# Patient Record
Sex: FEMALE | Race: WHITE | NOT HISPANIC OR LATINO | Employment: FULL TIME | URBAN - METROPOLITAN AREA
[De-identification: names, ages, dates, MRNs, and addresses within clinical notes are randomized per-mention and may not be internally consistent; named-entity substitution may affect disease eponyms.]

---

## 2020-12-28 ENCOUNTER — TELEPHONE (OUTPATIENT)
Dept: INTERNAL MEDICINE | Facility: CLINIC | Age: 20
End: 2020-12-28

## 2020-12-28 NOTE — TELEPHONE ENCOUNTER
Is she referring to the rash we discussed back in May? We did the topical gel that she has at home but we also used a steroid to get this to go away last time, so not surprised this ointment alone isn't making it better. However, oral steroids is soemthign I would want to see the rash for to make sure it is still a good idea bc its been a long time she she had that rash and with potential side effects of this type of medication want to make sure it is definitely necessary. If she can't wait to get in to see me, she may need to go to an urgent care to be seen sooner unfortunately. Looks like next time I can work her in reasonably is 1/7 between our [address], so that may be too long to wait to have it looked at

## 2020-12-28 NOTE — TELEPHONE ENCOUNTER
Pt called stating that the rash on her mouth has come back and has been using the gel that you had prescribed but it seems to now be making it worse. Asked if there was something else that she could take to help

## 2021-01-14 ENCOUNTER — OFFICE VISIT (OUTPATIENT)
Dept: INTERNAL MEDICINE | Facility: CLINIC | Age: 21
End: 2021-01-14

## 2021-01-14 VITALS
RESPIRATION RATE: 16 BRPM | SYSTOLIC BLOOD PRESSURE: 104 MMHG | DIASTOLIC BLOOD PRESSURE: 66 MMHG | WEIGHT: 155 LBS | OXYGEN SATURATION: 98 % | BODY MASS INDEX: 25.83 KG/M2 | HEART RATE: 114 BPM | TEMPERATURE: 97.7 F | HEIGHT: 65 IN

## 2021-01-14 DIAGNOSIS — L71.0 PERIORAL DERMATITIS: Primary | Chronic | ICD-10-CM

## 2021-01-14 DIAGNOSIS — F41.1 GENERALIZED ANXIETY DISORDER: ICD-10-CM

## 2021-01-14 DIAGNOSIS — G43.109 MIGRAINE WITH AURA AND WITHOUT STATUS MIGRAINOSUS, NOT INTRACTABLE: ICD-10-CM

## 2021-01-14 PROBLEM — L70.9 ACNE: Status: ACTIVE | Noted: 2021-01-14

## 2021-01-14 PROBLEM — M41.9 SCOLIOSIS: Status: ACTIVE | Noted: 2021-01-14

## 2021-01-14 PROCEDURE — 99214 OFFICE O/P EST MOD 30 MIN: CPT | Performed by: INTERNAL MEDICINE

## 2021-01-14 RX ORDER — AMITRIPTYLINE HYDROCHLORIDE 50 MG/1
50 TABLET, FILM COATED ORAL NIGHTLY
COMMUNITY
End: 2021-08-19

## 2021-01-14 RX ORDER — PROPRANOLOL HYDROCHLORIDE 40 MG/1
40 TABLET ORAL 3 TIMES DAILY PRN
Qty: 30 TABLET | Refills: 2 | Status: SHIPPED | OUTPATIENT
Start: 2021-01-14 | End: 2022-01-27

## 2021-01-14 RX ORDER — ESCITALOPRAM OXALATE 5 MG/1
5 TABLET ORAL DAILY
COMMUNITY
End: 2021-07-15

## 2021-01-14 RX ORDER — NORETHINDRONE ACETATE AND ETHINYL ESTRADIOL AND FERROUS FUMARATE 1MG-20(24)
KIT ORAL
COMMUNITY
End: 2021-07-15

## 2021-01-14 RX ORDER — RIZATRIPTAN BENZOATE 10 MG/1
10 TABLET, ORALLY DISINTEGRATING ORAL ONCE AS NEEDED
COMMUNITY
End: 2022-01-27 | Stop reason: SDUPTHER

## 2021-01-14 RX ORDER — METRONIDAZOLE 10 MG/G
GEL TOPICAL DAILY
Qty: 60 G | Refills: 2 | Status: SHIPPED | OUTPATIENT
Start: 2021-01-14

## 2021-01-14 NOTE — ASSESSMENT & PLAN NOTE
CONTROLLED  - cont on amitriptyline nightly for preventative medication, no refills needed currently  - cont prn use of maxalt, has not needed in several months

## 2021-01-14 NOTE — ASSESSMENT & PLAN NOTE
"UNCONTROLLED  - cont on lexapro at 5 mg daily for now, no refills needed at this time  - Reviewed potential side effects of medication including sexual performance changes, insomnia, fatigue, weight changes. Pt tolerating well without any issues.   - pt wants to try a prn medication for her \"bad days\" before increasing her lexapro, will send propranolol for prn use, reviewed potential side effects, benefits of meds and when to take them  - pt to schedule an appt with a counselor  "

## 2021-07-15 ENCOUNTER — OFFICE VISIT (OUTPATIENT)
Dept: INTERNAL MEDICINE | Facility: CLINIC | Age: 21
End: 2021-07-15

## 2021-07-15 VITALS
DIASTOLIC BLOOD PRESSURE: 76 MMHG | HEIGHT: 65 IN | WEIGHT: 147 LBS | SYSTOLIC BLOOD PRESSURE: 114 MMHG | HEART RATE: 68 BPM | RESPIRATION RATE: 16 BRPM | OXYGEN SATURATION: 98 % | BODY MASS INDEX: 24.49 KG/M2 | TEMPERATURE: 96.6 F

## 2021-07-15 DIAGNOSIS — Z00.00 ROUTINE GENERAL MEDICAL EXAMINATION AT A HEALTH CARE FACILITY: Primary | ICD-10-CM

## 2021-07-15 DIAGNOSIS — Z11.59 ENCOUNTER FOR HEPATITIS C SCREENING TEST FOR LOW RISK PATIENT: ICD-10-CM

## 2021-07-15 DIAGNOSIS — Z01.83 ENCOUNTER FOR BLOOD TYPING: ICD-10-CM

## 2021-07-15 DIAGNOSIS — G43.109 MIGRAINE WITH AURA AND WITHOUT STATUS MIGRAINOSUS, NOT INTRACTABLE: ICD-10-CM

## 2021-07-15 DIAGNOSIS — F41.1 GENERALIZED ANXIETY DISORDER: ICD-10-CM

## 2021-07-15 LAB
BILIRUB BLD-MCNC: NEGATIVE MG/DL
CLARITY, POC: CLEAR
COLOR UR: YELLOW
GLUCOSE UR STRIP-MCNC: NEGATIVE MG/DL
KETONES UR QL: NEGATIVE
LEUKOCYTE EST, POC: NEGATIVE
NITRITE UR-MCNC: NEGATIVE MG/ML
PH UR: 6 [PH] (ref 5–8)
PROT UR STRIP-MCNC: NEGATIVE MG/DL
RBC # UR STRIP: NEGATIVE /UL
SP GR UR: 1.03 (ref 1–1.03)
UROBILINOGEN UR QL: NORMAL

## 2021-07-15 PROCEDURE — 81003 URINALYSIS AUTO W/O SCOPE: CPT | Performed by: INTERNAL MEDICINE

## 2021-07-15 PROCEDURE — 90715 TDAP VACCINE 7 YRS/> IM: CPT | Performed by: INTERNAL MEDICINE

## 2021-07-15 PROCEDURE — 90471 IMMUNIZATION ADMIN: CPT | Performed by: INTERNAL MEDICINE

## 2021-07-15 PROCEDURE — 99395 PREV VISIT EST AGE 18-39: CPT | Performed by: INTERNAL MEDICINE

## 2021-07-15 RX ORDER — LEVONORGESTREL AND ETHINYL ESTRADIOL 0.15-0.03
1 KIT ORAL DAILY
COMMUNITY
Start: 2021-06-15 | End: 2022-01-27

## 2021-07-15 RX ORDER — CLINDAMYCIN PHOSPHATE 10 UG/ML
LOTION TOPICAL
COMMUNITY
Start: 2021-07-14 | End: 2022-01-27 | Stop reason: SDUPTHER

## 2021-07-15 RX ORDER — KETOCONAZOLE 20 MG/ML
SHAMPOO TOPICAL
COMMUNITY
Start: 2021-05-03 | End: 2022-01-27 | Stop reason: SDUPTHER

## 2021-07-15 NOTE — PROGRESS NOTES
"Chief Complaint   Patient presents with   • Annual Exam       Subjective   History of Present Illness    Merlene Baker is a 20 y.o. female here for annual wellness. Pt does not have acute issues to discuss today. States overall things are going well. Taking all meds as prescribed without any issues.     The following portions of the patient's history were reviewed and updated as appropriate: allergies, current medications, past family history, past medical history, past social history, past surgical history, and problem list.    Patient Care Team:  Bere Jasso MD as PCP - General (Internal Medicine & Pediatrics)  Ladonna Lara MD as Obstetrician (Obstetrics and Gynecology)    Review of Systems   Constitutional: Negative for activity change, chills and fever.   HENT: Negative for congestion, ear pain, rhinorrhea and sore throat.    Eyes: Negative for double vision, pain and visual disturbance.   Respiratory: Negative for cough, shortness of breath and wheezing.    Cardiovascular: Negative for chest pain, palpitations and leg swelling.   Gastrointestinal: Negative for abdominal pain, blood in stool, constipation, diarrhea, nausea and vomiting.   Endocrine: Negative for cold intolerance and heat intolerance.   Genitourinary: Negative for difficulty urinating, dysuria and frequency.   Musculoskeletal: Negative for arthralgias and myalgias.   Skin: Negative for rash and skin lesions.   Neurological: Negative for dizziness, tremors and headache.   Hematological: Negative for adenopathy. Does not bruise/bleed easily.   Psychiatric/Behavioral: Negative for behavioral problems and suicidal ideas.       Body mass index is 24.46 kg/m².   Vitals:    07/15/21 0811   BP: 114/76   Pulse: 68   Resp: 16   Temp: 96.6 °F (35.9 °C)   SpO2: 98%   Weight: 66.7 kg (147 lb)   Height: 165.1 cm (65\")        Objective   Physical Exam  Vitals and nursing note reviewed.   Constitutional:       General: She is not in acute " distress.     Appearance: Normal appearance.   HENT:      Head: Normocephalic and atraumatic.      Right Ear: Tympanic membrane and ear canal normal.      Left Ear: Tympanic membrane and ear canal normal.      Nose: Nose normal.      Mouth/Throat:      Mouth: Mucous membranes are moist.      Pharynx: Oropharynx is clear.   Eyes:      Extraocular Movements: Extraocular movements intact.      Conjunctiva/sclera: Conjunctivae normal.      Pupils: Pupils are equal, round, and reactive to light.   Cardiovascular:      Rate and Rhythm: Normal rate and regular rhythm.      Heart sounds: Normal heart sounds. No murmur heard.     Pulmonary:      Effort: Pulmonary effort is normal. No respiratory distress.      Breath sounds: Normal breath sounds. No wheezing or rhonchi.   Abdominal:      General: Bowel sounds are normal. There is no distension.      Palpations: Abdomen is soft. There is no mass.      Tenderness: There is no abdominal tenderness.      Comments: no hepatosplenomegaly   Musculoskeletal:         General: No deformity. Normal range of motion.      Cervical back: Normal range of motion and neck supple.   Skin:     General: Skin is warm and dry.      Capillary Refill: Capillary refill takes less than 2 seconds.      Findings: No lesion or rash.   Neurological:      General: No focal deficit present.      Mental Status: She is alert and oriented to person, place, and time.      Cranial Nerves: No cranial nerve deficit.   Psychiatric:         Mood and Affect: Mood normal.         Behavior: Behavior normal.         Judgment: Judgment normal.         Brief Urine Lab Results  (Last result in the past 365 days)      Color   Clarity   Blood   Leuk Est   Nitrite   Protein   CREAT   Urine HCG        07/15/21 0950 Yellow Clear Negative Negative Negative Negative                 Current Outpatient Medications:   •  Altavera 0.15-30 MG-MCG per tablet, Take 1 tablet by mouth Daily., Disp: , Rfl:   •  amitriptyline (ELAVIL) 50  MG tablet, Take 50 mg by mouth Every Night., Disp: , Rfl:   •  clindamycin (CLEOCIN T) 1 % lotion, , Disp: , Rfl:   •  ketoconazole (NIZORAL) 2 % shampoo, APPLY 1 UNIT AS DIRECTED USE TO WASH HAIR AND SCALP 2 3 TIMES A WEEK, Disp: , Rfl:   •  metroNIDAZOLE (METROGEL) 1 % gel, Apply  topically to the appropriate area as directed Daily., Disp: 60 g, Rfl: 2  •  propranolol (INDERAL) 40 MG tablet, Take 1 tablet by mouth 3 (Three) Times a Day As Needed (anxiety)., Disp: 30 tablet, Rfl: 2  •  rizatriptan MLT (MAXALT-MLT) 10 MG disintegrating tablet, Place 10 mg on the tongue 1 (One) Time As Needed. May repeat in 2 hours if needed, Disp: , Rfl:        Health Maintenance   Topic Date Due   • CHLAMYDIA SCREENING  Never done   • PAP SMEAR  Never done   • INFLUENZA VACCINE  08/01/2021   • TDAP/TD VACCINES (3 - Td or Tdap) 07/15/2031        Immunization History   Administered Date(s) Administered   • COVID-19 (MODERNA) 01/28/2021, 02/25/2021   • DTaP 02/08/1999, 04/08/1999, 06/03/1999, 03/01/2000, 05/09/2003   • Flu Vaccine Split Quad 11/06/2017   • Hepatitis A 05/11/2018, 07/12/2019   • Hepatitis B 02/08/1999, 06/03/1999, 12/06/1999   • HiB 04/08/1999, 03/01/2000   • IPV 02/08/1999, 09/03/1999, 12/06/1999, 03/01/2000   • Influenza, Unspecified 11/26/2019   • MMR 12/06/1999, 05/09/2003   • Meningococcal Conjugate 07/22/2010   • PEDS-Pneumococcal Conjugate (PCV7) 07/10/2000, 12/05/2000   • Tdap 07/22/2010, 07/22/2010, 07/15/2021   • Varicella 07/10/2000, 07/22/2010       Assessment/Plan     Annual Wellness  - Labs ordered today including CBC, CMP, Fasting lipid panel, HgbA1C, TSH, Vit D and Hep C. Will call with results once available and make follow up plan and med changes as appropriate.   - Cont current medications for chronic medical issues, refills sent as needed today.   - EKG not indicated  - PAP smear up to date and managed by gynecology. Last 12/20 and inadequate sample obtained, pending repeat at this time  - Mammo not  yet indicated, due at 41 yo  - Cscope not yet indicated, due at 44 yo  - DEXA not yet indicated, due at 62 yo  - Lung CA screening not indicated  - Immunizations: Flu due Fall 2021. TDaP due, done today. Hep A series completed. COVID series completed.   Orders Placed This Encounter   Procedures   • Tdap Vaccine Greater Than or Equal To 8yo IM      - Depression screen reviewed with patient and negative. Has propranolol for prn use with anxiety but has not needed routinely.   - Advised behavioral modifications including dietary changes and increased exercise with goal of healthy weight and lifestyle.       Return in about 6 months (around 1/15/2022) for follow up migraines/anxiety.     Kylie Jasso MD  [facility] [place] [facility]  Phone: [phone]  Fax: [phone]

## 2021-07-16 LAB
25(OH)D3+25(OH)D2 SERPL-MCNC: 56.6 NG/ML (ref 30–100)
ABO GROUP BLD: NORMAL
ALBUMIN SERPL-MCNC: 4.4 G/DL (ref 3.5–5.2)
ALBUMIN/GLOB SERPL: 1.5 G/DL
ALP SERPL-CCNC: 48 U/L (ref 39–117)
ALT SERPL-CCNC: 11 U/L (ref 1–33)
AST SERPL-CCNC: 15 U/L (ref 1–32)
BASOPHILS # BLD AUTO: 0.02 10*3/MM3 (ref 0–0.2)
BASOPHILS NFR BLD AUTO: 0.3 % (ref 0–1.5)
BILIRUB SERPL-MCNC: 0.5 MG/DL (ref 0–1.2)
BUN SERPL-MCNC: 10 MG/DL (ref 6–20)
BUN/CREAT SERPL: 9.6 (ref 7–25)
CALCIUM SERPL-MCNC: 9.5 MG/DL (ref 8.6–10.5)
CHLORIDE SERPL-SCNC: 106 MMOL/L (ref 98–107)
CHOLEST SERPL-MCNC: 175 MG/DL (ref 0–200)
CO2 SERPL-SCNC: 25.3 MMOL/L (ref 22–29)
CREAT SERPL-MCNC: 1.04 MG/DL (ref 0.57–1)
EOSINOPHIL # BLD AUTO: 0.08 10*3/MM3 (ref 0–0.4)
EOSINOPHIL NFR BLD AUTO: 1.3 % (ref 0.3–6.2)
ERYTHROCYTE [DISTWIDTH] IN BLOOD BY AUTOMATED COUNT: 12.7 % (ref 12.3–15.4)
GLOBULIN SER CALC-MCNC: 3 GM/DL
GLUCOSE SERPL-MCNC: 83 MG/DL (ref 65–99)
HBA1C MFR BLD: 5.2 % (ref 4.8–5.6)
HCT VFR BLD AUTO: 45.6 % (ref 34–46.6)
HCV AB S/CO SERPL IA: <0.1 S/CO RATIO (ref 0–0.9)
HDLC SERPL-MCNC: 39 MG/DL (ref 40–60)
HGB BLD-MCNC: 15.1 G/DL (ref 12–15.9)
IMM GRANULOCYTES # BLD AUTO: 0.02 10*3/MM3 (ref 0–0.05)
IMM GRANULOCYTES NFR BLD AUTO: 0.3 % (ref 0–0.5)
LDLC SERPL CALC-MCNC: 121 MG/DL (ref 0–100)
LYMPHOCYTES # BLD AUTO: 2.38 10*3/MM3 (ref 0.7–3.1)
LYMPHOCYTES NFR BLD AUTO: 39.5 % (ref 19.6–45.3)
MCH RBC QN AUTO: 28.5 PG (ref 26.6–33)
MCHC RBC AUTO-ENTMCNC: 33.1 G/DL (ref 31.5–35.7)
MCV RBC AUTO: 86.2 FL (ref 79–97)
MONOCYTES # BLD AUTO: 0.46 10*3/MM3 (ref 0.1–0.9)
MONOCYTES NFR BLD AUTO: 7.6 % (ref 5–12)
NEUTROPHILS # BLD AUTO: 3.06 10*3/MM3 (ref 1.7–7)
NEUTROPHILS NFR BLD AUTO: 51 % (ref 42.7–76)
NRBC BLD AUTO-RTO: 0 /100 WBC (ref 0–0.2)
PLATELET # BLD AUTO: 265 10*3/MM3 (ref 140–450)
POTASSIUM SERPL-SCNC: 4.1 MMOL/L (ref 3.5–5.2)
PROT SERPL-MCNC: 7.4 G/DL (ref 6–8.5)
RBC # BLD AUTO: 5.29 10*6/MM3 (ref 3.77–5.28)
RH BLD: NEGATIVE
SODIUM SERPL-SCNC: 141 MMOL/L (ref 136–145)
TRIGL SERPL-MCNC: 80 MG/DL (ref 0–150)
TSH SERPL DL<=0.005 MIU/L-ACNC: 1.8 UIU/ML (ref 0.27–4.2)
VLDLC SERPL CALC-MCNC: 15 MG/DL (ref 5–40)
WBC # BLD AUTO: 6.02 10*3/MM3 (ref 3.4–10.8)

## 2021-08-19 RX ORDER — AMITRIPTYLINE HYDROCHLORIDE 50 MG/1
TABLET, FILM COATED ORAL
Qty: 90 TABLET | Refills: 1 | Status: SHIPPED | OUTPATIENT
Start: 2021-08-19 | End: 2022-01-27 | Stop reason: SDUPTHER

## 2021-08-19 NOTE — TELEPHONE ENCOUNTER
Rx Refill Note  Requested Prescriptions     Pending Prescriptions Disp Refills   • amitriptyline (ELAVIL) 50 MG tablet [Pharmacy Med Name: AMITRIPTYLINE HCL 50 MG TAB] 90 tablet 1     Sig: TAKE 1 TABLET BY MOUTH NIGHTLY      Last office visit with prescribing clinician: 7/15/2021      Next office visit with prescribing clinician: 1/17/2022            Haylee Ruiz MA  08/19/21, 07:55 EDT

## 2022-01-27 ENCOUNTER — OFFICE VISIT (OUTPATIENT)
Dept: INTERNAL MEDICINE | Facility: CLINIC | Age: 22
End: 2022-01-27

## 2022-01-27 VITALS
HEART RATE: 107 BPM | HEIGHT: 65 IN | RESPIRATION RATE: 16 BRPM | WEIGHT: 153 LBS | DIASTOLIC BLOOD PRESSURE: 76 MMHG | OXYGEN SATURATION: 97 % | BODY MASS INDEX: 25.49 KG/M2 | TEMPERATURE: 97.1 F | SYSTOLIC BLOOD PRESSURE: 120 MMHG

## 2022-01-27 DIAGNOSIS — L71.0 PERIORAL DERMATITIS: Chronic | ICD-10-CM

## 2022-01-27 DIAGNOSIS — F41.1 GENERALIZED ANXIETY DISORDER: Primary | ICD-10-CM

## 2022-01-27 DIAGNOSIS — G43.109 MIGRAINE WITH AURA AND WITHOUT STATUS MIGRAINOSUS, NOT INTRACTABLE: ICD-10-CM

## 2022-01-27 DIAGNOSIS — L21.9 SEBORRHEIC DERMATITIS OF SCALP: ICD-10-CM

## 2022-01-27 PROCEDURE — 99214 OFFICE O/P EST MOD 30 MIN: CPT | Performed by: INTERNAL MEDICINE

## 2022-01-27 RX ORDER — CLINDAMYCIN PHOSPHATE 10 UG/ML
LOTION TOPICAL 2 TIMES DAILY
Qty: 60 ML | Refills: 2 | Status: SHIPPED | OUTPATIENT
Start: 2022-01-27

## 2022-01-27 RX ORDER — CLOBETASOL PROPIONATE 0.46 MG/ML
SOLUTION TOPICAL 2 TIMES DAILY
Qty: 50 ML | Refills: 3 | Status: SHIPPED | OUTPATIENT
Start: 2022-01-27

## 2022-01-27 RX ORDER — RIZATRIPTAN BENZOATE 10 MG/1
10 TABLET, ORALLY DISINTEGRATING ORAL ONCE AS NEEDED
Qty: 9 TABLET | Refills: 2 | Status: SHIPPED | OUTPATIENT
Start: 2022-01-27

## 2022-01-27 RX ORDER — KETOCONAZOLE 20 MG/ML
SHAMPOO TOPICAL 2 TIMES WEEKLY
Qty: 120 ML | Refills: 3 | Status: SHIPPED | OUTPATIENT
Start: 2022-01-27

## 2022-01-27 RX ORDER — AMITRIPTYLINE HYDROCHLORIDE 50 MG/1
50 TABLET, FILM COATED ORAL NIGHTLY
Qty: 90 TABLET | Refills: 1 | Status: SHIPPED | OUTPATIENT
Start: 2022-01-27 | End: 2022-08-03

## 2022-01-27 RX ORDER — HYDROXYZINE HYDROCHLORIDE 25 MG/1
25 TABLET, FILM COATED ORAL 3 TIMES DAILY PRN
Qty: 90 TABLET | Refills: 1 | Status: SHIPPED | OUTPATIENT
Start: 2022-01-27 | End: 2022-02-24

## 2022-01-27 RX ORDER — PROPRANOLOL HYDROCHLORIDE 40 MG/1
80 TABLET ORAL 3 TIMES DAILY PRN
Qty: 30 TABLET | Refills: 2 | Status: SHIPPED
Start: 2022-01-27 | End: 2022-03-21

## 2022-01-27 NOTE — ASSESSMENT & PLAN NOTE
CONTROLLED  - cont on amitriptyline nightly for preventative medication, no refills needed currently  - cont prn use of maxalt, has not needed in several months  - cont to avoid triggers the best possible, typically her migraines are hormonally driven but has had a migraine triggered by stress recently  - increase fluid hydration, avoid sleep deprivation, add magnesium at night if needed

## 2022-01-27 NOTE — ASSESSMENT & PLAN NOTE
UNCONTROLLED  - meds tried previously: lexapro, buspar, sertraline  - has propranolol for prn use, does note that this has not always been very effective for her--> instructed to increase dose to 60-80 mg per dose rather than her current 40 mg dose  - will send Rx for hydroxyzine for prn use, discussed potential side effects and how to titrate dose at home  - is following along with a counselor currently

## 2022-01-27 NOTE — PROGRESS NOTES
"Chief Complaint  Follow-up, Med Refill, Headache, and Anxiety    Subjective          Merlene Baker presents to [facility] for follow up and med refills. Pt taking all medications daily as prescribed with good reported compliance. No issues or side effects with meds.   Is seeing a counselor and working on anxiety management off SSRI meds, does not like they way they make her feel.   Taking TCA nightly for migraine prevention, did have 1 breakthrough headache recently that was stress related.       Objective   Vital Signs:     /76   Pulse 107   Temp 97.1 °F (36.2 °C)   Resp 16   Ht 165.1 cm (65\")   Wt 69.4 kg (153 lb)   SpO2 97%   BMI 25.46 kg/m²     Physical Exam  Vitals and nursing note reviewed.   Constitutional:       General: She is not in acute distress.     Appearance: Normal appearance.   Pulmonary:      Effort: Pulmonary effort is normal. No respiratory distress.   Neurological:      Mental Status: She is alert and oriented to person, place, and time. Mental status is at baseline.   Psychiatric:         Mood and Affect: Mood normal. Mood is not anxious or depressed. Affect is not tearful.         Behavior: Behavior normal. Behavior is cooperative.         Thought Content: Thought content normal.         Cognition and Memory: Cognition normal.         Judgment: Judgment normal.          Result Review :   The following data was reviewed by: Bere Jasso MD on 01/27/2022:  CMP    CMP 7/15/21   Glucose 83   BUN 10   Creatinine 1.04 (A)   eGFR Non  Am 66   eGFR African Am 80   Sodium 141   Potassium 4.1   Chloride 106   Calcium 9.5   Total Protein 7.4   Albumin 4.40   Globulin 3.0   Total Bilirubin 0.5   Alkaline Phosphatase 48   AST (SGOT) 15   ALT (SGPT) 11   (A) Abnormal value       Comments are available for some flowsheets but are not being displayed.           CBC w/diff    CBC w/Diff 7/15/21   WBC 6.02   RBC 5.29 (A) "   Hemoglobin 15.1   Hematocrit 45.6   MCV 86.2   MCH 28.5   MCHC 33.1   RDW 12.7   Platelets 265   Neutrophil Rel % 51.0   Lymphocyte Rel % 39.5   Monocyte Rel % 7.6   Eosinophil Rel % 1.3   Basophil Rel % 0.3   (A) Abnormal value            Lipid Panel    Lipid Panel 7/15/21   Total Cholesterol 175   Triglycerides 80   HDL Cholesterol 39 (A)   VLDL Cholesterol 15   LDL Cholesterol  121 (A)   (A) Abnormal value       Comments are available for some flowsheets but are not being displayed.           TSH    TSH 7/15/21   TSH 1.800           Most Recent A1C    HGBA1C Most Recent 7/15/21   Hemoglobin A1C 5.20      Comments are available for some flowsheets but are not being displayed.           Assessment and Plan      Diagnoses and all orders for this visit:    1. Generalized anxiety disorder (Primary)  Assessment & Plan:  UNCONTROLLED  - meds tried previously: lexapro, buspar, sertraline  - has propranolol for prn use, does note that this has not always been very effective for her--> instructed to increase dose to 60-80 mg per dose rather than her current 40 mg dose  - will send Rx for hydroxyzine for prn use, discussed potential side effects and how to titrate dose at home  - is following along with a counselor currently    Orders:  -     propranolol (INDERAL) 40 MG tablet; Take 2 tablets by mouth 3 (Three) Times a Day As Needed (anxiety).  Dispense: 30 tablet; Refill: 2  -     hydrOXYzine (ATARAX) 25 MG tablet; Take 1 tablet by mouth 3 (Three) Times a Day As Needed for Anxiety.  Dispense: 90 tablet; Refill: 1    2. Migraine with aura and without status migrainosus, not intractable  Assessment & Plan:  CONTROLLED  - cont on amitriptyline nightly for preventative medication, no refills needed currently  - cont prn use of maxalt, has not needed in several months  - cont to avoid triggers the best possible, typically her migraines are hormonally driven but has had a migraine triggered by stress recently  - increase fluid  hydration, avoid sleep deprivation, add magnesium at night if needed        Orders:  -     amitriptyline (ELAVIL) 50 MG tablet; Take 1 tablet by mouth Every Night.  Dispense: 90 tablet; Refill: 1  -     rizatriptan MLT (MAXALT-MLT) 10 MG disintegrating tablet; Place 1 tablet on the tongue 1 (One) Time As Needed for Migraine. May repeat in 2 hours if needed  Dispense: 9 tablet; Refill: 2    3. Perioral dermatitis  Assessment & Plan:  CONTROLLED  - has topical clindamycin and topical metrogel that she uses at home with outbreaks    Orders:  -     clindamycin (CLEOCIN T) 1 % lotion; Apply  topically to the appropriate area as directed 2 (Two) Times a Day.  Dispense: 60 mL; Refill: 2    4. Seborrheic dermatitis of scalp  -     ketoconazole (NIZORAL) 2 % shampoo; Apply  topically to the appropriate area as directed 2 (Two) Times a Week.  Dispense: 120 mL; Refill: 3  -     clobetasol (TEMOVATE) 0.05 % external solution; Apply  topically to the appropriate area as directed 2 (Two) Times a Day.  Dispense: 50 mL; Refill: 3      Follow Up   Return in about 6 months (around 7/27/2022) for Annual physical.    Patient was given instructions and counseling regarding her condition or for health maintenance advice. Please see specific information pulled into the AVS if appropriate.     Kylie Jasso MD  [facility] [place] [facility]  Phone: [phone]  Fax: [phone]

## 2022-02-24 DIAGNOSIS — F41.1 GENERALIZED ANXIETY DISORDER: ICD-10-CM

## 2022-02-24 RX ORDER — HYDROXYZINE HYDROCHLORIDE 25 MG/1
TABLET, FILM COATED ORAL
Qty: 90 TABLET | Refills: 1 | Status: SHIPPED | OUTPATIENT
Start: 2022-02-24 | End: 2022-03-24

## 2022-02-24 NOTE — TELEPHONE ENCOUNTER
Rx Refill Note  Requested Prescriptions     Pending Prescriptions Disp Refills   • hydrOXYzine (ATARAX) 25 MG tablet [Pharmacy Med Name: HYDROXYZINE HCL 25 MG TABLET] 90 tablet 1     Sig: TAKE 1 TABLET BY MOUTH 3 TIMES A DAY AS NEEDED FOR ANXIETY.      Last office visit with prescribing clinician: 1/27/2022      Next office visit with prescribing clinician: Visit date not found            Haylee Ruiz MA  02/24/22, 11:35 EST

## 2022-03-21 DIAGNOSIS — F41.1 GENERALIZED ANXIETY DISORDER: ICD-10-CM

## 2022-03-21 RX ORDER — PROPRANOLOL HYDROCHLORIDE 40 MG/1
TABLET ORAL
Qty: 30 TABLET | Refills: 2 | Status: SHIPPED | OUTPATIENT
Start: 2022-03-21 | End: 2022-08-11

## 2022-03-21 NOTE — TELEPHONE ENCOUNTER
Rx Refill Note  Requested Prescriptions     Pending Prescriptions Disp Refills   • propranolol (INDERAL) 40 MG tablet [Pharmacy Med Name: PROPRANOLOL 40 MG TABLET] 30 tablet 2     Sig: TAKE 1 TABLET BY MOUTH 3 (THREE) TIMES A DAY AS NEEDED (ANXIETY).      Last office visit with prescribing clinician: 1/27/2022      Next office visit with prescribing clinician: Visit date not found            Haylee Ruiz MA  03/21/22, 14:23 EDT

## 2022-03-24 DIAGNOSIS — F41.1 GENERALIZED ANXIETY DISORDER: ICD-10-CM

## 2022-03-24 RX ORDER — HYDROXYZINE HYDROCHLORIDE 25 MG/1
TABLET, FILM COATED ORAL
Qty: 90 TABLET | Refills: 1 | Status: SHIPPED | OUTPATIENT
Start: 2022-03-24 | End: 2022-04-26

## 2022-03-24 NOTE — TELEPHONE ENCOUNTER
Rx Refill Note  Requested Prescriptions     Pending Prescriptions Disp Refills   • hydrOXYzine (ATARAX) 25 MG tablet [Pharmacy Med Name: HYDROXYZINE HCL 25 MG TABLET] 90 tablet 1     Sig: TAKE 1 TABLET BY MOUTH THREE TIMES A DAY AS NEEDED FOR ANXIETY      Last office visit with prescribing clinician: 1/27/2022      Next office visit with prescribing clinician: Visit date not found            Saima Smith MA  03/24/22, 13:31 EDT

## 2022-04-26 DIAGNOSIS — F41.1 GENERALIZED ANXIETY DISORDER: ICD-10-CM

## 2022-04-26 RX ORDER — HYDROXYZINE HYDROCHLORIDE 25 MG/1
TABLET, FILM COATED ORAL
Qty: 90 TABLET | Refills: 1 | Status: SHIPPED | OUTPATIENT
Start: 2022-04-26 | End: 2022-05-31

## 2022-04-26 NOTE — TELEPHONE ENCOUNTER
Rx Refill Note  Requested Prescriptions     Pending Prescriptions Disp Refills   • hydrOXYzine (ATARAX) 25 MG tablet [Pharmacy Med Name: HYDROXYZINE HCL 25 MG TABLET] 90 tablet 1     Sig: TAKE 1 TABLET BY MOUTH THREE TIMES A DAY AS NEEDED FOR ANXIETY      Last office visit with prescribing clinician: 1/27/2022      Next office visit with prescribing clinician: Visit date not found            Haylee Ruiz MA  04/26/22, 09:35 EDT

## 2022-05-31 DIAGNOSIS — F41.1 GENERALIZED ANXIETY DISORDER: ICD-10-CM

## 2022-05-31 RX ORDER — HYDROXYZINE HYDROCHLORIDE 25 MG/1
TABLET, FILM COATED ORAL
Qty: 90 TABLET | Refills: 1 | Status: SHIPPED | OUTPATIENT
Start: 2022-05-31 | End: 2023-01-13 | Stop reason: SDUPTHER

## 2022-05-31 NOTE — TELEPHONE ENCOUNTER
Rx Refill Note  Requested Prescriptions     Pending Prescriptions Disp Refills   • hydrOXYzine (ATARAX) 25 MG tablet [Pharmacy Med Name: HYDROXYZINE HCL 25 MG TABLET] 90 tablet 1     Sig: TAKE 1 TABLET BY MOUTH THREE TIMES A DAY AS NEEDED FOR ANXIETY      Last office visit with prescribing clinician: 1/27/2022      Next office visit with prescribing clinician: Visit date not found            Haylee Ruiz MA  05/31/22, 12:40 EDT

## 2022-08-03 DIAGNOSIS — G43.109 MIGRAINE WITH AURA AND WITHOUT STATUS MIGRAINOSUS, NOT INTRACTABLE: ICD-10-CM

## 2022-08-03 RX ORDER — AMITRIPTYLINE HYDROCHLORIDE 50 MG/1
TABLET, FILM COATED ORAL
Qty: 90 TABLET | Refills: 1 | Status: SHIPPED | OUTPATIENT
Start: 2022-08-03 | End: 2023-02-09

## 2022-08-05 ENCOUNTER — OFFICE VISIT (OUTPATIENT)
Dept: INTERNAL MEDICINE | Facility: CLINIC | Age: 22
End: 2022-08-05

## 2022-08-05 VITALS
OXYGEN SATURATION: 99 % | WEIGHT: 152.4 LBS | DIASTOLIC BLOOD PRESSURE: 74 MMHG | BODY MASS INDEX: 25.39 KG/M2 | HEART RATE: 113 BPM | HEIGHT: 65 IN | TEMPERATURE: 97.1 F | SYSTOLIC BLOOD PRESSURE: 114 MMHG

## 2022-08-05 DIAGNOSIS — F41.1 GENERALIZED ANXIETY DISORDER: ICD-10-CM

## 2022-08-05 DIAGNOSIS — Z00.00 ROUTINE GENERAL MEDICAL EXAMINATION AT A HEALTH CARE FACILITY: Primary | ICD-10-CM

## 2022-08-05 PROCEDURE — 99395 PREV VISIT EST AGE 18-39: CPT | Performed by: INTERNAL MEDICINE

## 2022-08-05 NOTE — PROGRESS NOTES
Chief Complaint   Patient presents with   • Annual Exam       Subjective   History of Present Illness    Merlene Baker is a 21 y.o. female here for annual wellness. Pt does have acute issues to discuss today. Taking all meds as prescribed without any issues.   She notes her anxiety has not been well controlled lately, she has been working with a therapist who recommended Genesight testing to get better information so she is interested in this.     The following portions of the patient's history were reviewed and updated as appropriate: allergies, current medications, past family history, past medical history, past social history, past surgical history, and problem list.    Patient Care Team:  Bere Jasso MD as PCP - General (Internal Medicine & Pediatrics)  Ladonna Lara MD as Obstetrician (Obstetrics and Gynecology)    Review of Systems   Constitutional: Negative for activity change, chills and fever.   HENT: Negative for congestion, ear pain, rhinorrhea and sore throat.    Eyes: Negative for double vision, pain and visual disturbance.   Respiratory: Negative for cough, shortness of breath and wheezing.    Cardiovascular: Negative for chest pain, palpitations and leg swelling.   Gastrointestinal: Negative for abdominal pain, blood in stool, constipation, diarrhea, nausea and vomiting.   Endocrine: Negative for cold intolerance and heat intolerance.   Genitourinary: Negative for difficulty urinating, dysuria and frequency.   Musculoskeletal: Negative for arthralgias and myalgias.   Skin: Negative for rash and skin lesions.   Neurological: Negative for dizziness, tremors and headache.   Hematological: Negative for adenopathy. Does not bruise/bleed easily.   Psychiatric/Behavioral: Negative for behavioral problems and suicidal ideas.       Body mass index is 25.36 kg/m².   Vitals:    08/05/22 1332   BP: 114/74   BP Location: Left arm   Patient Position: Sitting   Pulse: 113   Temp: 97.1 °F (36.2 °C)  "  TempSrc: Temporal   SpO2: 99%   Weight: 69.1 kg (152 lb 6.4 oz)   Height: 165.1 cm (65\")        Objective   Physical Exam  Vitals and nursing note reviewed.   Constitutional:       General: She is not in acute distress.     Appearance: Normal appearance.   HENT:      Head: Normocephalic and atraumatic.      Right Ear: Tympanic membrane and ear canal normal.      Left Ear: Tympanic membrane and ear canal normal.      Nose: Nose normal.      Mouth/Throat:      Mouth: Mucous membranes are moist.      Pharynx: Oropharynx is clear.   Eyes:      Extraocular Movements: Extraocular movements intact.      Conjunctiva/sclera: Conjunctivae normal.      Pupils: Pupils are equal, round, and reactive to light.   Cardiovascular:      Rate and Rhythm: Normal rate and regular rhythm.      Heart sounds: Normal heart sounds. No murmur heard.  Pulmonary:      Effort: Pulmonary effort is normal. No respiratory distress.      Breath sounds: Normal breath sounds. No wheezing or rhonchi.   Abdominal:      General: Bowel sounds are normal. There is no distension.      Palpations: Abdomen is soft. There is no mass.      Tenderness: There is no abdominal tenderness.      Comments: no hepatosplenomegaly   Musculoskeletal:         General: No deformity. Normal range of motion.      Cervical back: Normal range of motion and neck supple.   Skin:     General: Skin is warm and dry.      Capillary Refill: Capillary refill takes less than 2 seconds.      Findings: No lesion or rash.   Neurological:      General: No focal deficit present.      Mental Status: She is alert and oriented to person, place, and time.      Cranial Nerves: No cranial nerve deficit.   Psychiatric:         Mood and Affect: Mood normal.         Behavior: Behavior normal.         Judgment: Judgment normal.           Current Outpatient Medications:   •  amitriptyline (ELAVIL) 50 MG tablet, TAKE 1 TABLET BY MOUTH EVERY DAY AT NIGHT, Disp: 90 tablet, Rfl: 1  •  clindamycin " (CLEOCIN T) 1 % lotion, Apply  topically to the appropriate area as directed 2 (Two) Times a Day., Disp: 60 mL, Rfl: 2  •  clobetasol (TEMOVATE) 0.05 % external solution, Apply  topically to the appropriate area as directed 2 (Two) Times a Day., Disp: 50 mL, Rfl: 3  •  ketoconazole (NIZORAL) 2 % shampoo, Apply  topically to the appropriate area as directed 2 (Two) Times a Week., Disp: 120 mL, Rfl: 3  •  metroNIDAZOLE (METROGEL) 1 % gel, Apply  topically to the appropriate area as directed Daily., Disp: 60 g, Rfl: 2  •  rizatriptan MLT (MAXALT-MLT) 10 MG disintegrating tablet, Place 1 tablet on the tongue 1 (One) Time As Needed for Migraine. May repeat in 2 hours if needed, Disp: 9 tablet, Rfl: 2  •  hydrOXYzine (ATARAX) 25 MG tablet, TAKE 1 TABLET BY MOUTH THREE TIMES A DAY AS NEEDED FOR ANXIETY, Disp: 90 tablet, Rfl: 1  •  propranolol (INDERAL) 40 MG tablet, TAKE 1 TABLET BY MOUTH 3 (THREE) TIMES A DAY AS NEEDED (ANXIETY)., Disp: 30 tablet, Rfl: 2     Lab Results   Component Value Date    HGBA1C 5.20 07/15/2021    TSH 1.800 07/15/2021    NQZA86MD 56.6 07/15/2021        Health Maintenance   Topic Date Due   • CHLAMYDIA SCREENING  Never done   • INFLUENZA VACCINE  10/01/2022   • PAP SMEAR  12/30/2023   • TDAP/TD VACCINES (3 - Td or Tdap) 07/15/2031        Immunization History   Administered Date(s) Administered   • COVID-19 (MODERNA) 1st, 2nd, 3rd Dose Only 01/28/2021, 02/25/2021, 11/27/2021   • DTaP 02/08/1999, 04/08/1999, 06/03/1999, 03/01/2000, 05/09/2003   • Flu Vaccine Split Quad 11/06/2017   • FluLaval/Fluarix/Fluzone >6 11/06/2017   • Hepatitis A 05/11/2018, 07/12/2019   • Hepatitis B 02/08/1999, 06/03/1999, 12/06/1999   • HiB 04/08/1999, 03/01/2000   • IPV 02/08/1999, 09/03/1999, 12/06/1999, 03/01/2000   • Influenza, Unspecified 11/26/2019, 11/25/2021   • MMR 12/06/1999, 05/09/2003   • Meningococcal Conjugate 07/22/2010   • PEDS-Pneumococcal Conjugate (PCV7) 07/10/2000, 12/05/2000   • Tdap 07/22/2010,  07/22/2010, 07/15/2021   • Varicella 07/10/2000, 07/22/2010       Assessment & Plan     Annual Wellness  - Labs ordered today including BMP to follow up renal function. . Will call with results once available and make follow up plan and med changes as appropriate.   - Cont current medications for chronic medical issues, refills sent as needed today.   - EKG not indicated  - PAP smear up to date and managed by gynecology. Last 2021 and reportedly negative, will obtain copy of report.   - Mammo not yet indicated, due at 39 yo  - Cscope not yet indicated, due at 44 yo  - DEXA not yet indicated, due at 66 yo  - Lung CA screening not indicated  - Immunizations: Flu shot due Fall 2022. COVID series and booster completed. TDaP done 2021, UTD. Hep A and Hep B series completed. Believes HPV series was completed in middle school.    - Depression screen reviewed with patient and positive. Will proceed with GeneSight testing per pt request, discussed limitations of this testing and real world applications. Will follow up once test results available and make treatment decisions at that time.   - Advised behavioral modifications including dietary changes and increased exercise with goal of healthy weight and lifestyle.       Return for follow up after GeneSight testing available.     Kylie Jasso MD  [facility] [place] [facility]  Phone: [phone]  Fax: [phone]

## 2022-08-06 LAB
BUN SERPL-MCNC: 9 MG/DL (ref 6–20)
BUN/CREAT SERPL: 9 (ref 9–23)
CALCIUM SERPL-MCNC: 9 MG/DL (ref 8.7–10.2)
CHLORIDE SERPL-SCNC: 100 MMOL/L (ref 96–106)
CO2 SERPL-SCNC: 23 MMOL/L (ref 20–29)
CREAT SERPL-MCNC: 0.98 MG/DL (ref 0.57–1)
EGFRCR SERPLBLD CKD-EPI 2021: 83 ML/MIN/1.73
GLUCOSE SERPL-MCNC: 118 MG/DL (ref 65–99)
POTASSIUM SERPL-SCNC: 3.7 MMOL/L (ref 3.5–5.2)
SODIUM SERPL-SCNC: 139 MMOL/L (ref 134–144)

## 2022-08-10 DIAGNOSIS — F41.1 GENERALIZED ANXIETY DISORDER: ICD-10-CM

## 2022-08-11 RX ORDER — PROPRANOLOL HYDROCHLORIDE 40 MG/1
TABLET ORAL
Qty: 30 TABLET | Refills: 2 | Status: SHIPPED | OUTPATIENT
Start: 2022-08-11 | End: 2022-11-28 | Stop reason: SDUPTHER

## 2022-09-23 ENCOUNTER — TELEPHONE (OUTPATIENT)
Dept: INTERNAL MEDICINE | Facility: CLINIC | Age: 22
End: 2022-09-23

## 2022-09-23 NOTE — TELEPHONE ENCOUNTER
PATIENT WOULD LIKE A CALL BACK WITH HER GENE SITE TEST RESULTS THAT SHE HAD ABOUT 7 OR 8 WEEKS AGO.    PLEASE CONTACT HER BACK -[phone]

## 2022-09-23 NOTE — TELEPHONE ENCOUNTER
Hmmm ya that is strange, I do recall that we did this and I know I documented that we collected it, but I think this happened when you were gone and we had a float that day, so it may not have gotten processed correctly that day

## 2022-10-25 ENCOUNTER — OFFICE VISIT (OUTPATIENT)
Dept: INTERNAL MEDICINE | Facility: CLINIC | Age: 22
End: 2022-10-25

## 2022-10-25 VITALS
HEART RATE: 126 BPM | DIASTOLIC BLOOD PRESSURE: 70 MMHG | TEMPERATURE: 97.9 F | RESPIRATION RATE: 16 BRPM | OXYGEN SATURATION: 97 % | WEIGHT: 155 LBS | SYSTOLIC BLOOD PRESSURE: 104 MMHG | BODY MASS INDEX: 25.83 KG/M2 | HEIGHT: 65 IN

## 2022-10-25 DIAGNOSIS — F41.1 GENERALIZED ANXIETY DISORDER: Primary | ICD-10-CM

## 2022-10-25 DIAGNOSIS — G43.109 MIGRAINE WITH AURA AND WITHOUT STATUS MIGRAINOSUS, NOT INTRACTABLE: ICD-10-CM

## 2022-10-25 PROCEDURE — 99214 OFFICE O/P EST MOD 30 MIN: CPT | Performed by: INTERNAL MEDICINE

## 2022-10-25 RX ORDER — DESVENLAFAXINE SUCCINATE 50 MG/1
50 TABLET, EXTENDED RELEASE ORAL DAILY
Qty: 30 TABLET | Refills: 1 | Status: SHIPPED | OUTPATIENT
Start: 2022-10-25 | End: 2022-11-28 | Stop reason: SDUPTHER

## 2022-10-25 NOTE — PROGRESS NOTES
"Chief Complaint  Follow-up and Anxiety    Subjective        Merlene Baker presents to [facility]  History of Present Illness     Here for follow up of primarily anxiety and also some depression. She has tried many daily medications in the past that did not work for her due to side effects and minimal improvement in overall symptoms. Was on Zoloft for 6-7 years 6 years ago and suffered many side effects. Failed Buspar though patient unsure why. Most recently tried Lexapro for ~2 years 2-3 years ago. Stopped taking this medication because she felt like there was no improvement in her symptoms. Started taking only PRN Propanolol TID approximately 1 year ago. medications Hydroxyzine Q6H PRN prescribed in January. Currently takes hydroxyzine 25 mg ~ 1 time daily and propanolol 80 mg ~ 1 time daily. When she does takes these medications they help with the palpitations and does take some of the edge off of the anxiety.     She is still seeing her counselor, Radha Cotto, every 2 weeks. Patient does feel like this helps a little bit.     She is still taking Amitriptyline nightly. Migraines remain well controlled on this, has not had a migraine in approximately 1 year.       Objective   Vital Signs:  /70   Pulse (!) 126   Temp 97.9 °F (36.6 °C)   Resp 16   Ht 165.1 cm (65\")   Wt 70.3 kg (155 lb)   SpO2 97%   BMI 25.79 kg/m²   Estimated body mass index is 25.79 kg/m² as calculated from the following:    Height as of this encounter: 165.1 cm (65\").    Weight as of this encounter: 70.3 kg (155 lb).          Physical Exam  Constitutional:       General: She is not in acute distress.     Appearance: Normal appearance. She is not toxic-appearing.   HENT:      Head: Normocephalic and atraumatic.      Right Ear: External ear normal.      Left Ear: External ear normal.      Nose: Nose normal.      Mouth/Throat:      Mouth: Mucous membranes are moist.      Pharynx: " Oropharynx is clear.   Eyes:      Extraocular Movements: Extraocular movements intact.      Conjunctiva/sclera: Conjunctivae normal.      Pupils: Pupils are equal, round, and reactive to light.   Cardiovascular:      Rate and Rhythm: Normal rate and regular rhythm.      Pulses: Normal pulses.      Heart sounds: Normal heart sounds. No murmur heard.  Pulmonary:      Effort: Pulmonary effort is normal.      Breath sounds: Normal breath sounds. No wheezing or rhonchi.   Abdominal:      General: Bowel sounds are normal.      Palpations: Abdomen is soft.   Musculoskeletal:         General: Normal range of motion.      Cervical back: Normal range of motion and neck supple.   Skin:     General: Skin is warm and dry.      Capillary Refill: Capillary refill takes less than 2 seconds.   Neurological:      General: No focal deficit present.      Mental Status: She is alert and oriented to person, place, and time.   Psychiatric:         Mood and Affect: Mood normal.         Behavior: Behavior normal.        Result Review :  The following data was reviewed by: Bere Jasso MD on 10/25/2022:    Data reviewed: Patho[employer] Testing          Assessment and Plan   Diagnoses and all orders for this visit:    1. Generalized anxiety disorder (Primary)  Assessment & Plan:  UNCONTROLLED  - meds tried previously: lexapro, buspar, sertraline  - Discussed result of gene tests with patients  - Will start on Desvenlafaxine today based on gene test results  - Cont PRN proponalol 60-80 mg TID  - Cont Hydroxyzine 25 mg PRN, discussed potential side effects and how to titrate dose at home  - is following along with a counselor currently    Orders:  -     desvenlafaxine (PRISTIQ) 50 MG 24 hr tablet; Take 1 tablet by mouth Daily.  Dispense: 30 tablet; Refill: 1    2. Migraine with aura and without status migrainosus, not intractable  Assessment & Plan:  CONTROLLED  - cont on amitriptyline nightly for preventative medication, no refills needed  currently  - cont prn use of maxalt, has not needed in several months  - cont to avoid triggers the best possible, typically her migraines are hormonally driven but has had a migraine triggered by stress recently  - increase fluid hydration, avoid sleep deprivation, add magnesium at night if needed            Follow Up   Return in about 1 year (around 10/25/2023) for follow up anxiety.  Patient was given instructions and counseling regarding her condition or for health maintenance advice. Please see specific information pulled into the AVS if appropriate.     Ariana Alatorre, PGY3    Patient seen and evaluated with Dr. Alatorre. Pertinent portions of history and exam repeated. Agree with assessment and plan as above. 20 yo F here with uncontrolled anxiety for follow up on [employer] Testing. Report does show potential for many drug interactions and side effects with various medications due to slow metabolism at receptors. Will attempt new medication today based off of this information and start pt on desvenlafaxine at low dose of 50 mg per day. Ok to cont propranolol and hydroxyzine prn for now, hoping to see use decrease with time as new medication becomes effective.     Kylie Jasso MD  [facility] [place] [facility]  Phone: [phone]  Fax: [phone]

## 2022-10-25 NOTE — ASSESSMENT & PLAN NOTE
UNCONTROLLED  - meds tried previously: lexapro, buspar, sertraline  - Discussed result of gene tests with patients  - Will start on Desvenlafaxine today based on gene test results  - Cont PRN proponalol 60-80 mg TID  - Cont Hydroxyzine 25 mg PRN, discussed potential side effects and how to titrate dose at home  - is following along with a counselor currently

## 2022-11-22 DIAGNOSIS — F41.1 GENERALIZED ANXIETY DISORDER: ICD-10-CM

## 2022-11-28 ENCOUNTER — OFFICE VISIT (OUTPATIENT)
Dept: INTERNAL MEDICINE | Facility: CLINIC | Age: 22
End: 2022-11-28

## 2022-11-28 VITALS
RESPIRATION RATE: 16 BRPM | DIASTOLIC BLOOD PRESSURE: 72 MMHG | TEMPERATURE: 97.2 F | BODY MASS INDEX: 25.79 KG/M2 | HEART RATE: 107 BPM | SYSTOLIC BLOOD PRESSURE: 104 MMHG | OXYGEN SATURATION: 96 % | HEIGHT: 65 IN

## 2022-11-28 DIAGNOSIS — F41.1 GENERALIZED ANXIETY DISORDER: Primary | ICD-10-CM

## 2022-11-28 PROBLEM — R14.0 ABDOMINAL BLOATING: Status: ACTIVE | Noted: 2022-11-28

## 2022-11-28 PROCEDURE — 99214 OFFICE O/P EST MOD 30 MIN: CPT | Performed by: INTERNAL MEDICINE

## 2022-11-28 RX ORDER — DESVENLAFAXINE 25 MG/1
25 TABLET, EXTENDED RELEASE ORAL DAILY
Qty: 30 TABLET | Refills: 1 | Status: SHIPPED | OUTPATIENT
Start: 2022-11-28 | End: 2023-01-13 | Stop reason: SDUPTHER

## 2022-11-28 RX ORDER — PROPRANOLOL HYDROCHLORIDE 80 MG/1
80 TABLET ORAL 3 TIMES DAILY PRN
Qty: 90 TABLET | Refills: 1 | Status: SHIPPED | OUTPATIENT
Start: 2022-11-28 | End: 2022-12-21

## 2022-11-28 RX ORDER — DESVENLAFAXINE SUCCINATE 50 MG/1
50 TABLET, EXTENDED RELEASE ORAL DAILY
Qty: 30 TABLET | Refills: 1 | Status: SHIPPED | OUTPATIENT
Start: 2022-11-28 | End: 2023-01-13 | Stop reason: SDUPTHER

## 2022-11-28 NOTE — ASSESSMENT & PLAN NOTE
IMPROVING  - Has had improvement in mood, continues to have anxiety, overall tolerating medication well with some minor GI upset  - meds tried previously: lexapro, buspar, sertraline  - Started Desvenlafaxine based on gene test results  - Will increase Desvenlafaxine to 75 mg today   - Cont PRN proponalol 680 mg TID  - Cont Hydroxyzine 25 mg PRN, discussed potential side effects and how to titrate dose at home  - Cont following with counselor

## 2022-11-28 NOTE — PROGRESS NOTES
"Chief Complaint  Follow-up and Anxiety    Subjective          Merlene Baker presents to [facility] for follow-up on anxiety and depression.   During the first week, felt like she had an increase in her anxiety. Has still not seen a significant improvement in her anxiety.   Does feel like mood has been improved from a depression standpoint.   Has been taking the medication daily. Has seemed to have a little bit of GI upset. She feels like she has had some worsening insomnia.   She takes the hydroxyzine and propranolol at least once daily.     Objective   Vital Signs:   /72   Pulse 107   Temp 97.2 °F (36.2 °C)   Resp 16   Ht 165.1 cm (65\")   SpO2 96%   BMI 25.79 kg/m²     Physical Exam  Vitals and nursing note reviewed.   Constitutional:       General: She is not in acute distress.     Appearance: Normal appearance.   Eyes:      General: No scleral icterus.     Conjunctiva/sclera: Conjunctivae normal.   Cardiovascular:      Rate and Rhythm: Normal rate and regular rhythm.      Heart sounds: Normal heart sounds. No murmur heard.  Pulmonary:      Effort: Pulmonary effort is normal. No respiratory distress.      Breath sounds: Normal breath sounds.   Abdominal:      General: Bowel sounds are normal. There is no distension.      Palpations: Abdomen is soft.   Musculoskeletal:         General: No swelling.      Right lower leg: No edema.      Left lower leg: No edema.   Neurological:      General: No focal deficit present.      Mental Status: She is alert. Mental status is at baseline.          Result Review : : None     Assessment and Plan      Diagnoses and all orders for this visit:    1. Generalized anxiety disorder (Primary)  Assessment & Plan:  IMPROVING  - Has had improvement in mood, continues to have anxiety, overall tolerating medication well with some minor GI upset  - meds tried previously: lexapro, buspar, sertraline  - Started Desvenlafaxine " based on gene test results  - Will increase Desvenlafaxine to 75 mg today   - Cont PRN proponalol 680 mg TID  - Cont Hydroxyzine 25 mg PRN, discussed potential side effects and how to titrate dose at home  - Cont following with counselor     Orders:  -     desvenlafaxine (PRISTIQ) 50 MG 24 hr tablet; Take 1 tablet by mouth Daily.  Dispense: 30 tablet; Refill: 1  -     Desvenlafaxine Succinate ER 25 MG tablet sustained-release 24 hour; Take 1 tablet by mouth Daily. With 50 mg tab for a total of 75 mg daily  Dispense: 30 tablet; Refill: 1  -     propranolol (INDERAL) 80 MG tablet; Take 1 tablet by mouth 3 (Three) Times a Day As Needed (anxiety).  Dispense: 90 tablet; Refill: 1    2. Abdominal bloating  - Symptoms consistent with IBS and discussed with patient   - Patient to trial FOMAP diet  - Recommend initiation of IBgard OTC  - Return to clinic if worsening prior to next scheduled follow-up     Follow Up   Return in about 1 month (around 12/28/2022) for follow up anxiety.    Patient was given instructions and counseling regarding her condition or for health maintenance advice. Please see specific information pulled into the AVS if appropriate.     Summer Rojas MD  Internal Medicine and Pediatrics, PGY-4    Patient seen and evaluated with Dr. Rojas. Pertinent portions of history and exam repeated. Agree with assessment and plan as above.  21-year-old female with generalized anxiety disorder here for follow-up.  Started on Pristiq 50 mg at her last appointment, has seen mixed results.  Has seen improvement in her mood overall but actually felt a transient worsening in her anxiety.  This has settled some now.  Will attempt to escalate therapy further to 75 mg daily and monitor to see if heightened anxiety worsens with escalated dose or improves.  Can continue to use hydroxyzine nightly as needed for sleep.  Will increase dose of propranolol by request as this has been effective for her to use during the day.  Also  discussed generalized GI upset that does seem to correlate with her mood.  No formal diagnosis of IBS but symptoms sound suspicious, recommend starting FODMAPs diet and adding OTC supplement IBGard.  We will follow-up in 4 to 6 weeks.    Kylie Jasso MD  [facility] [place] [facility] [place] [facility]  Phone: [phone]  Fax: [phone]

## 2022-12-21 DIAGNOSIS — F41.1 GENERALIZED ANXIETY DISORDER: ICD-10-CM

## 2022-12-21 RX ORDER — PROPRANOLOL HYDROCHLORIDE 80 MG/1
80 TABLET ORAL 3 TIMES DAILY PRN
Qty: 90 TABLET | Refills: 1 | Status: SHIPPED | OUTPATIENT
Start: 2022-12-21 | End: 2023-01-18

## 2022-12-21 NOTE — TELEPHONE ENCOUNTER
Rx Refill Note  Requested Prescriptions     Pending Prescriptions Disp Refills   • propranolol (INDERAL) 80 MG tablet [Pharmacy Med Name: PROPRANOLOL 80 MG TABLET] 90 tablet 1     Sig: TAKE 1 TABLET BY MOUTH 3 (THREE) TIMES A DAY AS NEEDED (ANXIETY).      Last office visit with prescribing clinician: 11/28/2022   Last telemedicine visit with prescribing clinician: 12/29/2022   Next office visit with prescribing clinician: 12/29/2022                         Would you like a call back once the refill request has been completed: [] Yes [] No    If the office needs to give you a call back, can they leave a voicemail: [] Yes [] No    Go Piña MA  12/21/22, 11:08 EST

## 2022-12-22 NOTE — PROGRESS NOTES
"Chief Complaint  Rash    Subjective          Merlene Baker presents to [facility] for rash. Similar to previous rash from May, responded well to steroids in May and fully resolved, just back again. Now more dry with more peeling. No fever.   Taking lexapro daily for anxiety but notes things are not well controlled currently. Plans to start seeing a therapist    Objective   Vital Signs:     /66   Pulse 114   Temp 97.7 °F (36.5 °C)   Resp 16   Ht 165.1 cm (65\")   Wt 70.3 kg (155 lb)   SpO2 98%   BMI 25.79 kg/m²     Physical Exam  Vitals signs and nursing note reviewed.   Constitutional:       General: She is not in acute distress.     Appearance: Normal appearance.   Skin:     Findings: Rash (erythematous papules on erythematous base sstretching from nasolabial folds around the mouth and chin, no overlying excoriations or irritation) present.   Neurological:      Mental Status: She is alert.   Psychiatric:         Mood and Affect: Mood normal.         Thought Content: Thought content normal.         Judgment: Judgment normal.          Result Review : : None         Assessment and Plan      Diagnoses and all orders for this visit:    1. Perioral dermatitis (Primary)  -     metroNIDAZOLE (METROGEL) 1 % gel; Apply  topically to the appropriate area as directed Daily.  Dispense: 60 g; Refill: 2    2. Generalized anxiety disorder  Assessment & Plan:  UNCONTROLLED  - cont on lexapro at 5 mg daily for now, no refills needed at this time  - Reviewed potential side effects of medication including sexual performance changes, insomnia, fatigue, weight changes. Pt tolerating well without any issues.   - pt wants to try a prn medication for her \"bad days\" before increasing her lexapro, will send propranolol for prn use, reviewed potential side effects, benefits of meds and when to take them  - pt to schedule an appt with a counselor    Orders:  -     propranolol " Take Vitamin B6  50 mg tbl po before dinner. Recommend Citrucel or Metamucil  1 tablespoonful in 8 oz water po before dinner. (INDERAL) 40 MG tablet; Take 1 tablet by mouth 3 (Three) Times a Day As Needed (anxiety).  Dispense: 30 tablet; Refill: 2    3. Migraine with aura and without status migrainosus, not intractable  Assessment & Plan:  CONTROLLED  - cont on amitriptyline nightly for preventative medication, no refills needed currently  - cont prn use of maxalt, has not needed in several months            Follow Up   Return in about 6 months (around 7/14/2021) for Annual physical.    Patient was given instructions and counseling regarding her condition or for health maintenance advice. Please see specific information pulled into the AVS if appropriate.     Kylie Jasos MD  [facility] [place] [facility]  Phone: [phone]  Fax: [phone]

## 2023-01-13 ENCOUNTER — OFFICE VISIT (OUTPATIENT)
Dept: INTERNAL MEDICINE | Facility: CLINIC | Age: 23
End: 2023-01-13
Payer: COMMERCIAL

## 2023-01-13 VITALS
HEIGHT: 65 IN | RESPIRATION RATE: 16 BRPM | TEMPERATURE: 97.2 F | OXYGEN SATURATION: 99 % | HEART RATE: 96 BPM | BODY MASS INDEX: 25.79 KG/M2 | SYSTOLIC BLOOD PRESSURE: 116 MMHG | DIASTOLIC BLOOD PRESSURE: 74 MMHG

## 2023-01-13 DIAGNOSIS — F41.1 GENERALIZED ANXIETY DISORDER: Primary | ICD-10-CM

## 2023-01-13 PROCEDURE — 99214 OFFICE O/P EST MOD 30 MIN: CPT | Performed by: INTERNAL MEDICINE

## 2023-01-13 RX ORDER — HYDROXYZINE HYDROCHLORIDE 25 MG/1
25 TABLET, FILM COATED ORAL 3 TIMES DAILY PRN
Qty: 270 TABLET | Refills: 1 | Status: SHIPPED | OUTPATIENT
Start: 2023-01-13

## 2023-01-13 RX ORDER — BUSPIRONE HYDROCHLORIDE 10 MG/1
10 TABLET ORAL 3 TIMES DAILY
Qty: 90 TABLET | Refills: 1 | Status: SHIPPED | OUTPATIENT
Start: 2023-01-13 | End: 2023-02-10 | Stop reason: SDUPTHER

## 2023-01-13 RX ORDER — DESVENLAFAXINE SUCCINATE 50 MG/1
50 TABLET, EXTENDED RELEASE ORAL DAILY
Qty: 90 TABLET | Refills: 1 | Status: SHIPPED | OUTPATIENT
Start: 2023-01-13

## 2023-01-13 RX ORDER — DESVENLAFAXINE 25 MG/1
25 TABLET, EXTENDED RELEASE ORAL DAILY
Qty: 90 TABLET | Refills: 1 | Status: SHIPPED | OUTPATIENT
Start: 2023-01-13

## 2023-01-13 NOTE — PROGRESS NOTES
"Chief Complaint  Follow-up and Anxiety    Subjective          Merlene Baker presents to [facility] for follow up on anxiety. Pt had genesight testing done last year and at that time she was placed on pristiq as a result of those test results. Feels like mood wise she is doing well on this medication but her anxiety is very out of control. It is impacting her ability to work, causing physical symptoms with GI problems.     Objective   Vital Signs:     /74   Pulse 96   Temp 97.2 °F (36.2 °C)   Resp 16   Ht 165.1 cm (65\")   SpO2 99%   BMI 25.79 kg/m²     Physical Exam  Vitals and nursing note reviewed.   Constitutional:       General: She is not in acute distress.     Appearance: Normal appearance.   Pulmonary:      Effort: Pulmonary effort is normal. No respiratory distress.   Neurological:      Mental Status: She is alert and oriented to person, place, and time. Mental status is at baseline.   Psychiatric:         Mood and Affect: Mood is anxious. Mood is not depressed. Affect is flat. Affect is not tearful.         Speech: Speech normal. Speech is not rapid and pressured.         Behavior: Behavior normal. Behavior is not hyperactive. Behavior is cooperative.         Thought Content: Thought content normal.         Cognition and Memory: Cognition normal.         Judgment: Judgment normal.          Result Review : : None     Assessment and Plan      Diagnoses and all orders for this visit:    1. Generalized anxiety disorder (Primary)  Assessment & Plan:  ONGOING  - currently on pristiq 75 mg daily--> will cont this medication for now as it has helped with her overall mood but anxiety seems to be worsening currently  - will augment SNRI with buspar, has been on this medication as monotherapy in the past with little benefit, but will see if able to use in combination with SNRI as above if it is more effective  - Cont PRN proponalol 80 mg TID  - Cont " Hydroxyzine 25 mg PRN, discussed potential side effects and how to titrate dose at home  - Cont following with counselor  - meds tried previously: lexapro, buspar, sertraline    Orders:  -     busPIRone (BUSPAR) 10 MG tablet; Take 1 tablet by mouth 3 (Three) Times a Day.  Dispense: 90 tablet; Refill: 1  -     Desvenlafaxine Succinate ER 25 MG tablet sustained-release 24 hour; Take 1 tablet by mouth Daily. With 50 mg tab for a total of 75 mg daily  Dispense: 90 tablet; Refill: 1  -     desvenlafaxine (PRISTIQ) 50 MG 24 hr tablet; Take 1 tablet by mouth Daily.  Dispense: 90 tablet; Refill: 1  -     hydrOXYzine (ATARAX) 25 MG tablet; Take 1 tablet by mouth 3 (Three) Times a Day As Needed for Anxiety.  Dispense: 270 tablet; Refill: 1      Follow Up   Return in about 4 weeks (around 2/10/2023) for follow up.    Patient was given instructions and counseling regarding her condition or for health maintenance advice. Please see specific information pulled into the AVS if appropriate.     Kylie Jasso MD  [facility] [place] [facility]  Phone: [phone]  Fax: [phone]

## 2023-01-13 NOTE — ASSESSMENT & PLAN NOTE
ONGOING  - currently on pristiq 75 mg daily--> will cont this medication for now as it has helped with her overall mood but anxiety seems to be worsening currently  - will augment SNRI with buspar, has been on this medication as monotherapy in the past with little benefit, but will see if able to use in combination with SNRI as above if it is more effective  - Cont PRN proponalol 80 mg TID  - Cont Hydroxyzine 25 mg PRN, discussed potential side effects and how to titrate dose at home  - Cont following with counselor  - meds tried previously: lexapro, buspar, sertraline

## 2023-01-18 DIAGNOSIS — F41.1 GENERALIZED ANXIETY DISORDER: ICD-10-CM

## 2023-01-18 RX ORDER — PROPRANOLOL HYDROCHLORIDE 80 MG/1
80 TABLET ORAL 3 TIMES DAILY PRN
Qty: 90 TABLET | Refills: 1 | Status: SHIPPED | OUTPATIENT
Start: 2023-01-18 | End: 2023-02-12

## 2023-02-08 DIAGNOSIS — G43.109 MIGRAINE WITH AURA AND WITHOUT STATUS MIGRAINOSUS, NOT INTRACTABLE: ICD-10-CM

## 2023-02-09 RX ORDER — AMITRIPTYLINE HYDROCHLORIDE 50 MG/1
TABLET, FILM COATED ORAL
Qty: 90 TABLET | Refills: 1 | Status: SHIPPED | OUTPATIENT
Start: 2023-02-09 | End: 2023-02-13 | Stop reason: SDUPTHER

## 2023-02-09 NOTE — TELEPHONE ENCOUNTER
Rx Refill Note  Requested Prescriptions     Pending Prescriptions Disp Refills   • amitriptyline (ELAVIL) 50 MG tablet [Pharmacy Med Name: AMITRIPTYLINE HCL 50 MG TAB] 90 tablet 1     Sig: TAKE 1 TABLET BY MOUTH EVERY DAY AT NIGHT      Last office visit with prescribing clinician: 1/13/2023   Last telemedicine visit with prescribing clinician: 2/13/2023   Next office visit with prescribing clinician: 2/13/2023                         Would you like a call back once the refill request has been completed: [] Yes [] No    If the office needs to give you a call back, can they leave a voicemail: [] Yes [] No    Haylee Ruiz MA  02/09/23, 07:59 EST

## 2023-02-10 DIAGNOSIS — F41.1 GENERALIZED ANXIETY DISORDER: ICD-10-CM

## 2023-02-10 RX ORDER — BUSPIRONE HYDROCHLORIDE 10 MG/1
10 TABLET ORAL 3 TIMES DAILY
Qty: 90 TABLET | Refills: 1 | Status: SHIPPED | OUTPATIENT
Start: 2023-02-10 | End: 2023-02-13 | Stop reason: DRUGHIGH

## 2023-02-10 NOTE — TELEPHONE ENCOUNTER
Rx Refill Note  Requested Prescriptions     Pending Prescriptions Disp Refills   • busPIRone (BUSPAR) 10 MG tablet 90 tablet 1     Sig: Take 1 tablet by mouth 3 (Three) Times a Day.      Last office visit with prescribing clinician: 1/13/2023   Last telemedicine visit with prescribing clinician: 2/13/2023   Next office visit with prescribing clinician: 2/13/2023   Office Visit with Bere Jasso MD (01/13/2023)  }                  Would you like a call back once the refill request has been completed: [] Yes [] No    If the office needs to give you a call back, can they leave a voicemail: [] Yes [] No    Ernesto Castellanos MA  02/10/23, 15:48 EST

## 2023-02-12 DIAGNOSIS — F41.1 GENERALIZED ANXIETY DISORDER: ICD-10-CM

## 2023-02-12 RX ORDER — PROPRANOLOL HYDROCHLORIDE 80 MG/1
80 TABLET ORAL 3 TIMES DAILY PRN
Qty: 90 TABLET | Refills: 1 | Status: SHIPPED | OUTPATIENT
Start: 2023-02-12

## 2023-02-13 ENCOUNTER — OFFICE VISIT (OUTPATIENT)
Dept: INTERNAL MEDICINE | Facility: CLINIC | Age: 23
End: 2023-02-13
Payer: COMMERCIAL

## 2023-02-13 VITALS
BODY MASS INDEX: 26.82 KG/M2 | HEIGHT: 65 IN | TEMPERATURE: 97.6 F | WEIGHT: 161 LBS | OXYGEN SATURATION: 97 % | RESPIRATION RATE: 16 BRPM | HEART RATE: 91 BPM

## 2023-02-13 DIAGNOSIS — F41.1 GENERALIZED ANXIETY DISORDER: Primary | ICD-10-CM

## 2023-02-13 DIAGNOSIS — G43.109 MIGRAINE WITH AURA AND WITHOUT STATUS MIGRAINOSUS, NOT INTRACTABLE: ICD-10-CM

## 2023-02-13 PROCEDURE — 99214 OFFICE O/P EST MOD 30 MIN: CPT | Performed by: INTERNAL MEDICINE

## 2023-02-13 RX ORDER — BUSPIRONE HYDROCHLORIDE 15 MG/1
15 TABLET ORAL 3 TIMES DAILY
Qty: 90 TABLET | Refills: 2 | Status: SHIPPED | OUTPATIENT
Start: 2023-02-13

## 2023-02-13 RX ORDER — AMITRIPTYLINE HYDROCHLORIDE 50 MG/1
50 TABLET, FILM COATED ORAL
Qty: 90 TABLET | Refills: 1 | Status: SHIPPED | OUTPATIENT
Start: 2023-02-13

## 2023-02-13 NOTE — PROGRESS NOTES
"Chief Complaint  Follow-up and Anxiety    Subjective          Merlene Baker presents to [facility] for follow up on anxiety. Was last seen 1 month ago and buspar was added to pristiq to try to improve her overall anxiety control. She does still feel like the pristiq is helping her depressed mood symptoms and thinks the buspar addition has helped with her anxiety. No side effects from buspar, does feel like it is working but likely needs higher dose. Still some issue with focus and concentration    Objective   Vital Signs:     Pulse 91   Temp 97.6 °F (36.4 °C)   Resp 16   Ht 165.1 cm (65\")   Wt 73 kg (161 lb)   SpO2 97%   BMI 26.79 kg/m²     Physical Exam  Vitals and nursing note reviewed.   Constitutional:       General: She is not in acute distress.     Appearance: Normal appearance.   Pulmonary:      Effort: Pulmonary effort is normal. No respiratory distress.   Neurological:      Mental Status: She is alert and oriented to person, place, and time. Mental status is at baseline.   Psychiatric:         Mood and Affect: Mood is anxious. Mood is not depressed. Affect is not tearful.         Speech: Speech normal.         Behavior: Behavior normal.         Thought Content: Thought content normal.         Cognition and Memory: Cognition normal.         Judgment: Judgment normal.          Result Review : : None     Assessment and Plan      Diagnoses and all orders for this visit:    1. Generalized anxiety disorder (Primary)  Assessment & Plan:  IMPROVING  - currently on pristiq 75 mg daily--> will cont this medication for now as it has helped with her overall mood but anxiety seems to be worsening currently  - will increase buspar to 15 mg TID for ongoing improvements  - Cont PRN proponalol 80 mg TID  - Cont Hydroxyzine 25 mg PRN, discussed potential side effects and how to titrate dose at home  - Cont following with counselor  - meds tried previously: lexapro, " buspar, sertraline    Orders:  -     busPIRone (BUSPAR) 15 MG tablet; Take 1 tablet by mouth 3 (Three) Times a Day.  Dispense: 90 tablet; Refill: 2    2. Migraine with aura and without status migrainosus, not intractable  -     amitriptyline (ELAVIL) 50 MG tablet; Take 1 tablet by mouth every night at bedtime.  Dispense: 90 tablet; Refill: 1        Follow Up   Return in about 2 months (around 4/13/2023) for follow up.    Patient was given instructions and counseling regarding her condition or for health maintenance advice. Please see specific information pulled into the AVS if appropriate.     Kylie Jasso MD  [facility]  Phone: [phone]  Fax: [phone]

## 2023-02-13 NOTE — ASSESSMENT & PLAN NOTE
IMPROVING  - currently on pristiq 75 mg daily--> will cont this medication for now as it has helped with her overall mood but anxiety seems to be worsening currently  - will increase buspar to 15 mg TID for ongoing improvements  - Cont PRN proponalol 80 mg TID  - Cont Hydroxyzine 25 mg PRN, discussed potential side effects and how to titrate dose at home  - Cont following with counselor  - meds tried previously: lexapro, buspar, sertraline

## 2023-04-10 DIAGNOSIS — F41.1 GENERALIZED ANXIETY DISORDER: ICD-10-CM

## 2023-04-10 RX ORDER — PROPRANOLOL HYDROCHLORIDE 80 MG/1
80 TABLET ORAL 3 TIMES DAILY PRN
Qty: 90 TABLET | Refills: 1 | Status: SHIPPED | OUTPATIENT
Start: 2023-04-10

## 2023-04-10 NOTE — TELEPHONE ENCOUNTER
Rx Refill Note  Requested Prescriptions     Pending Prescriptions Disp Refills   • propranolol (INDERAL) 80 MG tablet [Pharmacy Med Name: PROPRANOLOL 80 MG TABLET] 90 tablet 1     Sig: TAKE 1 TABLET BY MOUTH 3 (THREE) TIMES A DAY AS NEEDED (ANXIETY).      Last office visit with prescribing clinician: 2/13/2023   Last telemedicine visit with prescribing clinician: 4/13/2023   Next office visit with prescribing clinician: 4/13/2023                         Would you like a call back once the refill request has been completed: [] Yes [] No    If the office needs to give you a call back, can they leave a voicemail: [] Yes [] No    Haylee Ruiz MA  04/10/23, 12:56 EDT

## 2023-04-13 ENCOUNTER — OFFICE VISIT (OUTPATIENT)
Dept: INTERNAL MEDICINE | Facility: CLINIC | Age: 23
End: 2023-04-13
Payer: COMMERCIAL

## 2023-04-13 VITALS
TEMPERATURE: 97.5 F | DIASTOLIC BLOOD PRESSURE: 78 MMHG | BODY MASS INDEX: 26.49 KG/M2 | WEIGHT: 159 LBS | HEART RATE: 120 BPM | RESPIRATION RATE: 16 BRPM | HEIGHT: 65 IN | OXYGEN SATURATION: 99 % | SYSTOLIC BLOOD PRESSURE: 110 MMHG

## 2023-04-13 DIAGNOSIS — F41.1 GENERALIZED ANXIETY DISORDER: Primary | ICD-10-CM

## 2023-04-13 RX ORDER — BUSPIRONE HYDROCHLORIDE 10 MG/1
20 TABLET ORAL 3 TIMES DAILY
Qty: 540 TABLET | Refills: 1 | Status: SHIPPED | OUTPATIENT
Start: 2023-04-13

## 2023-04-13 NOTE — PROGRESS NOTES
"Chief Complaint  Follow-up and Anxiety    Subjective          Merlene Baker presents to [facility] for follow up on anxiety. Pt was seen 2 mos ago and buspar dose was increased to try to better control symptoms. Since that time, does feel like things are better than before but does have more situational/ social anxiety than she thinks is probably normal. Has tried propranolol which is helpful occasionally but rarely ever to catch it before symptoms escalate.     Objective   Vital Signs:     /78   Pulse 120   Temp 97.5 °F (36.4 °C)   Resp 16   Ht 165.1 cm (65\")   Wt 72.1 kg (159 lb)   SpO2 99%   BMI 26.46 kg/m²     Physical Exam  Vitals and nursing note reviewed.   Constitutional:       General: She is not in acute distress.     Appearance: Normal appearance.   Pulmonary:      Effort: Pulmonary effort is normal. No respiratory distress.   Neurological:      Mental Status: She is alert and oriented to person, place, and time. Mental status is at baseline.   Psychiatric:         Mood and Affect: Mood normal. Mood is not anxious or depressed. Affect is not tearful.         Speech: Speech normal.         Behavior: Behavior normal.         Thought Content: Thought content normal.         Judgment: Judgment normal.          Result Review : : None     Assessment and Plan      Diagnoses and all orders for this visit:    1. Generalized anxiety disorder (Primary)  Assessment & Plan:  IMPROVING  - currently on pristiq 75 mg daily--> will cont this medication for now as it has helped with her overall mood but anxiety seems to be worsening currently  - will increase buspar to 20 mg TID for ongoing improvements  - Cont PRN proponalol 80 mg TID, will trial taking dose every morning before work and trying to be proactive about taking med before situations to see if this is more helpful  - Cont Hydroxyzine 25 mg PRN, discussed potential side effects and how to titrate " dose at home  - Cont following with counselor  - meds tried previously: lexapro, buspar, sertraline    Orders:  -     busPIRone (BUSPAR) 10 MG tablet; Take 2 tablets by mouth 3 (Three) Times a Day.  Dispense: 540 tablet; Refill: 1      Follow Up   Return in about 2 months (around 6/13/2023) for follow up.    Patient was given instructions and counseling regarding her condition or for health maintenance advice. Please see specific information pulled into the AVS if appropriate.     Kylie Jasso MD  [facility] [place] [facility]  Phone: [phone]  Fax: [phone]

## 2023-04-13 NOTE — ASSESSMENT & PLAN NOTE
IMPROVING  - currently on pristiq 75 mg daily--> will cont this medication for now as it has helped with her overall mood but anxiety seems to be worsening currently  - will increase buspar to 20 mg TID for ongoing improvements  - Cont PRN proponalol 80 mg TID, will trial taking dose every morning before work and trying to be proactive about taking med before situations to see if this is more helpful  - Cont Hydroxyzine 25 mg PRN, discussed potential side effects and how to titrate dose at home  - Cont following with counselor  - meds tried previously: lexapro buspar, sertraline

## 2023-05-14 DIAGNOSIS — F41.1 GENERALIZED ANXIETY DISORDER: ICD-10-CM

## 2023-05-15 RX ORDER — PROPRANOLOL HYDROCHLORIDE 80 MG/1
80 TABLET ORAL 3 TIMES DAILY PRN
Qty: 90 TABLET | Refills: 1 | Status: SHIPPED | OUTPATIENT
Start: 2023-05-15

## 2023-06-13 DIAGNOSIS — F41.1 GENERALIZED ANXIETY DISORDER: ICD-10-CM

## 2023-06-13 RX ORDER — PROPRANOLOL HYDROCHLORIDE 80 MG/1
80 TABLET ORAL 3 TIMES DAILY PRN
Qty: 90 TABLET | Refills: 1 | Status: SHIPPED | OUTPATIENT
Start: 2023-06-13

## 2024-01-23 ENCOUNTER — OFFICE VISIT (OUTPATIENT)
Dept: INTERNAL MEDICINE | Facility: CLINIC | Age: 24
End: 2024-01-23
Payer: COMMERCIAL

## 2024-01-23 VITALS
BODY MASS INDEX: 27.49 KG/M2 | SYSTOLIC BLOOD PRESSURE: 110 MMHG | WEIGHT: 165 LBS | DIASTOLIC BLOOD PRESSURE: 70 MMHG | TEMPERATURE: 98 F | HEIGHT: 65 IN

## 2024-01-23 DIAGNOSIS — F41.1 GENERALIZED ANXIETY DISORDER: ICD-10-CM

## 2024-01-23 DIAGNOSIS — G43.109 MIGRAINE WITH AURA AND WITHOUT STATUS MIGRAINOSUS, NOT INTRACTABLE: Primary | ICD-10-CM

## 2024-01-23 DIAGNOSIS — Z00.00 ANNUAL PHYSICAL EXAM: ICD-10-CM

## 2024-01-23 DIAGNOSIS — R15.2 FECAL URGENCY: ICD-10-CM

## 2024-01-23 PROCEDURE — 99214 OFFICE O/P EST MOD 30 MIN: CPT | Performed by: PHYSICIAN ASSISTANT

## 2024-01-23 RX ORDER — DESVENLAFAXINE SUCCINATE 50 MG/1
50 TABLET, EXTENDED RELEASE ORAL DAILY
Qty: 90 TABLET | Refills: 1 | Status: SHIPPED | OUTPATIENT
Start: 2024-01-23 | End: 2024-01-26 | Stop reason: SDUPTHER

## 2024-01-23 RX ORDER — DESVENLAFAXINE 25 MG/1
25 TABLET, EXTENDED RELEASE ORAL DAILY
Qty: 90 TABLET | Refills: 1 | Status: SHIPPED | OUTPATIENT
Start: 2024-01-23 | End: 2024-01-26 | Stop reason: SDUPTHER

## 2024-01-23 RX ORDER — SPIRONOLACTONE 50 MG/1
50 TABLET, FILM COATED ORAL 2 TIMES DAILY
COMMUNITY
Start: 2024-01-02

## 2024-01-23 NOTE — PROGRESS NOTES
Subjective   Chief Complaint   Patient presents with    bowel urgency     Started in the last couple months, establish care    Anxiety       History of Present Illness     Pt is a 23 yr old female with anxiety, acne, and migraines who presents today to establish care as a new patient; transferring from Dr. Jasso.  She takes 75 mg of Pristiq daily along with Buspar 30 mg BID daily and hydroxyzine/propranolol prn. She still sees her therapist once a month. Her anxiety is overall fairly well controlled.  She takes Amitrtipyline 50 mg at bedtime for migraine prevention. It is very effective, rarely uses Maxalt.      Takes Spironolactone 50 mg BID for hormonal acne. Does not take birth control.     Has had chronic GI sx. [place] to be related to anxiety over the years. Has had increase in urgency of bowel movements over the last few months. No warning. Sometimes diarrhea, occasionally normal bowel movement. Started a few months ago. No significant diet changes. Does not take otc laxatives. Will have at least one bm daily, sometimes 3-4 daily. She does travel frequently for work which will affect her bowels as well. Will occasionally have constipation with traveling. Diet is moderately healthy. Thinks she probably does not get enough fiber in her diet, does try to eat an aequate amount of fresh fruits and vegetables daily. No rectal bleeding. No melena. Sometimes will have abdominal bloating. No rectal pain, no changes in weight. No fhx of colon cancer, polyps, IBD or IBS.       Patient Active Problem List   Diagnosis    Migraine with aura and without status migrainosus, not intractable    Anxiety disorder, unspecified    Dysmenorrhea, unspecified    Acne    Scoliosis    Perioral dermatitis    Abdominal bloating       No Known Allergies    Current Outpatient Medications on File Prior to Visit   Medication Sig Dispense Refill    amitriptyline (ELAVIL) 50 MG tablet Take 1 tablet by mouth every night at bedtime. 90 tablet 1     busPIRone (BUSPAR) 10 MG tablet Take 2 tablets by mouth 3 (Three) Times a Day. 540 tablet 1    clobetasol (TEMOVATE) 0.05 % external solution Apply  topically to the appropriate area as directed 2 (Two) Times a Day. 50 mL 3    hydrOXYzine (ATARAX) 25 MG tablet Take 1 tablet by mouth 3 (Three) Times a Day As Needed for Anxiety. 270 tablet 1    ketoconazole (NIZORAL) 2 % shampoo APPLY TOPICALLY TO THE APPROPRIATE AREA AS DIRECTED 2 (TWO) TIMES A WEEK. 120 mL 3    propranolol (INDERAL) 80 MG tablet Take 1 tablet by mouth 3 (Three) Times a Day As Needed (anxiety). 90 tablet 1    rizatriptan MLT (MAXALT-MLT) 10 MG disintegrating tablet Place 1 tablet on the tongue 1 (One) Time As Needed for Migraine. May repeat in 2 hours if needed 9 tablet 2    spironolactone (ALDACTONE) 50 MG tablet Take 1 tablet by mouth 2 (Two) Times a Day.      [DISCONTINUED] desvenlafaxine (PRISTIQ) 50 MG 24 hr tablet Take 1 tablet by mouth Daily. With 25 mg dose for a total of 75 mg daily 90 tablet 1    [DISCONTINUED] Desvenlafaxine Succinate ER 25 MG tablet sustained-release 24 hour Take 1 tablet by mouth Daily. With 50 mg tab for a total of 75 mg daily 90 tablet 1    [DISCONTINUED] clindamycin (CLEOCIN T) 1 % lotion Apply  topically to the appropriate area as directed 2 (Two) Times a Day. 60 mL 2    [DISCONTINUED] metroNIDAZOLE (METROGEL) 1 % gel Apply  topically to the appropriate area as directed Daily. 60 g 2     No current facility-administered medications on file prior to visit.       Past Medical History:   Diagnosis Date    Anxiety disorder, unspecified     Atopic dermatitis, unspecified     Depression 2010    Dysmenorrhea, unspecified     Epigastric pain     History of MRI 01/01/2014    BRAIN NORMAL (DONE FOR COMPLICATED MIGRAINES)    Menstrual migraine, not intractable, without status migrainosus     Migraine with aura, not intractable, without status migrainosus     Pain in right finger(s)     Perioral dermatitis 01/14/2021        Family History   Problem Relation Age of Onset    Stroke Mother         TIA    Depression Father     Alcohol abuse Father     Cancer Father         Retinal    Cancer Paternal Grandfather         melanoma    Stroke Maternal Grandmother     Arthritis Maternal Grandmother     Hyperlipidemia Maternal Grandmother     Thyroid disease Maternal Grandmother        Social History     Socioeconomic History    Marital status: Single   Tobacco Use    Smoking status: Never    Smokeless tobacco: Never   Vaping Use    Vaping Use: Never used   Substance and Sexual Activity    Alcohol use: Yes     Alcohol/week: 2.0 standard drinks of alcohol     Types: 2 Shots of liquor per week    Drug use: Never    Sexual activity: Yes     Partners: Male     Birth control/protection: Condom       Past Surgical History:   Procedure Laterality Date    ADENOIDECTOMY  2000    TONSILLECTOMY  2000         The following portions of the patient's history were reviewed and updated as appropriate: problem list, allergies, current medications, past medical history, past family history, past social history, and past surgical history.    ROS    See HPI    Immunization History   Administered Date(s) Administered    COVID-19 (MODERNA) 1st,2nd,3rd Dose Monovalent 01/28/2021, 02/25/2021, 11/27/2021    DTaP 02/08/1999, 04/08/1999, 06/03/1999, 03/01/2000, 05/09/2003    Flu Vaccine Split Quad 11/06/2017    Fluzone (or Fluarix & Flulaval for VFC) >6mos 11/06/2017    Hepatitis A 05/11/2018, 07/12/2019    Hepatitis B Adult/Adolescent IM 02/08/1999, 06/03/1999, 12/06/1999    HiB 04/08/1999, 03/01/2000    IPV 02/08/1999, 09/03/1999, 12/06/1999, 03/01/2000    Influenza, Unspecified 11/26/2019, 11/25/2021, 09/05/2022    MMR 12/06/1999, 05/09/2003    Meningococcal Conjugate 07/22/2010    PEDS-Pneumococcal Conjugate (PCV7) 07/10/2000, 12/05/2000    Tdap 07/22/2010, 07/22/2010, 07/15/2021    Varicella 07/10/2000, 07/22/2010       Objective   Vitals:    01/23/24 1037   BP:  "110/70   Temp: 98 °F (36.7 °C)   Weight: 74.8 kg (165 lb)   Height: 165.1 cm (65\")     Body mass index is 27.46 kg/m².  Physical Exam  Vitals reviewed.   Constitutional:       Appearance: Normal appearance.   HENT:      Head: Normocephalic and atraumatic.   Cardiovascular:      Rate and Rhythm: Normal rate and regular rhythm.      Heart sounds: Normal heart sounds.   Pulmonary:      Effort: Pulmonary effort is normal.      Breath sounds: Normal breath sounds.   Abdominal:      General: Abdomen is flat. Bowel sounds are normal.      Palpations: Abdomen is soft.   Neurological:      Mental Status: She is alert.       Assessment & Plan   Diagnoses and all orders for this visit:    1. Migraine with aura and without status migrainosus, not intractable (Primary)  Comments:  Stable well controlled.    2. Generalized anxiety disorder  Comments:  Overall controlled  Orders:  -     desvenlafaxine (PRISTIQ) 50 MG 24 hr tablet; Take 1 tablet by mouth Daily. With 25 mg dose for a total of 75 mg daily  Dispense: 90 tablet; Refill: 1  -     Desvenlafaxine Succinate ER 25 MG tablet sustained-release 24 hour; Take 1 tablet by mouth Daily. With 50 mg tab for a total of 75 mg daily  Dispense: 90 tablet; Refill: 1    3. Annual physical exam  -     Vitamin D,25-Hydroxy  -     CBC & Differential  -     Comprehensive Metabolic Panel  -     Lipid Panel With / Chol / HDL Ratio    4. Fecal urgency  Comments:  Start IbGard and Fiber daily, fup in 4 weeks     Reviewed previous records from PCP.     Return in about 4 weeks (around 2/20/2024) for Lab Today.           "

## 2024-01-24 LAB
25(OH)D3+25(OH)D2 SERPL-MCNC: 30.9 NG/ML (ref 30–100)
ALBUMIN SERPL-MCNC: 4.6 G/DL (ref 3.5–5.2)
ALBUMIN/GLOB SERPL: 1.6 G/DL
ALP SERPL-CCNC: 56 U/L (ref 39–117)
ALT SERPL-CCNC: 12 U/L (ref 1–33)
AST SERPL-CCNC: 15 U/L (ref 1–32)
BASOPHILS # BLD AUTO: 0.02 10*3/MM3 (ref 0–0.2)
BASOPHILS NFR BLD AUTO: 0.3 % (ref 0–1.5)
BILIRUB SERPL-MCNC: 0.3 MG/DL (ref 0–1.2)
BUN SERPL-MCNC: 11 MG/DL (ref 6–20)
BUN/CREAT SERPL: 8.5 (ref 7–25)
CALCIUM SERPL-MCNC: 9.2 MG/DL (ref 8.6–10.5)
CHLORIDE SERPL-SCNC: 101 MMOL/L (ref 98–107)
CHOLEST SERPL-MCNC: 170 MG/DL (ref 0–200)
CHOLEST/HDLC SERPL: 3.54 {RATIO}
CO2 SERPL-SCNC: 27.6 MMOL/L (ref 22–29)
CREAT SERPL-MCNC: 1.29 MG/DL (ref 0.57–1)
EGFRCR SERPLBLD CKD-EPI 2021: 59.2 ML/MIN/1.73
EOSINOPHIL # BLD AUTO: 0.11 10*3/MM3 (ref 0–0.4)
EOSINOPHIL NFR BLD AUTO: 1.9 % (ref 0.3–6.2)
ERYTHROCYTE [DISTWIDTH] IN BLOOD BY AUTOMATED COUNT: 12.6 % (ref 12.3–15.4)
GLOBULIN SER CALC-MCNC: 2.8 GM/DL
GLUCOSE SERPL-MCNC: 79 MG/DL (ref 65–99)
HCT VFR BLD AUTO: 44.6 % (ref 34–46.6)
HDLC SERPL-MCNC: 48 MG/DL (ref 40–60)
HGB BLD-MCNC: 14.6 G/DL (ref 12–15.9)
IMM GRANULOCYTES # BLD AUTO: 0.02 10*3/MM3 (ref 0–0.05)
IMM GRANULOCYTES NFR BLD AUTO: 0.3 % (ref 0–0.5)
LDLC SERPL CALC-MCNC: 102 MG/DL (ref 0–100)
LYMPHOCYTES # BLD AUTO: 1.82 10*3/MM3 (ref 0.7–3.1)
LYMPHOCYTES NFR BLD AUTO: 31.1 % (ref 19.6–45.3)
MCH RBC QN AUTO: 27.7 PG (ref 26.6–33)
MCHC RBC AUTO-ENTMCNC: 32.7 G/DL (ref 31.5–35.7)
MCV RBC AUTO: 84.6 FL (ref 79–97)
MONOCYTES # BLD AUTO: 0.44 10*3/MM3 (ref 0.1–0.9)
MONOCYTES NFR BLD AUTO: 7.5 % (ref 5–12)
NEUTROPHILS # BLD AUTO: 3.44 10*3/MM3 (ref 1.7–7)
NEUTROPHILS NFR BLD AUTO: 58.9 % (ref 42.7–76)
NRBC BLD AUTO-RTO: 0 /100 WBC (ref 0–0.2)
PLATELET # BLD AUTO: 275 10*3/MM3 (ref 140–450)
POTASSIUM SERPL-SCNC: 4.3 MMOL/L (ref 3.5–5.2)
PROT SERPL-MCNC: 7.4 G/DL (ref 6–8.5)
RBC # BLD AUTO: 5.27 10*6/MM3 (ref 3.77–5.28)
SODIUM SERPL-SCNC: 138 MMOL/L (ref 136–145)
TRIGL SERPL-MCNC: 111 MG/DL (ref 0–150)
VLDLC SERPL CALC-MCNC: 20 MG/DL (ref 5–40)
WBC # BLD AUTO: 5.85 10*3/MM3 (ref 3.4–10.8)

## 2024-01-26 DIAGNOSIS — F41.1 GENERALIZED ANXIETY DISORDER: ICD-10-CM

## 2024-01-26 RX ORDER — DESVENLAFAXINE 25 MG/1
25 TABLET, EXTENDED RELEASE ORAL DAILY
Qty: 30 TABLET | Refills: 0 | Status: SHIPPED | OUTPATIENT
Start: 2024-01-26 | End: 2024-01-26 | Stop reason: SDUPTHER

## 2024-01-26 RX ORDER — DESVENLAFAXINE SUCCINATE 50 MG/1
50 TABLET, EXTENDED RELEASE ORAL DAILY
Qty: 30 TABLET | Refills: 0 | Status: SHIPPED | OUTPATIENT
Start: 2024-01-26 | End: 2024-01-26 | Stop reason: SDUPTHER

## 2024-01-26 RX ORDER — DESVENLAFAXINE 25 MG/1
25 TABLET, EXTENDED RELEASE ORAL DAILY
Qty: 90 TABLET | Refills: 1 | Status: SHIPPED | OUTPATIENT
Start: 2024-01-26 | End: 2024-01-29 | Stop reason: SDUPTHER

## 2024-01-26 RX ORDER — DESVENLAFAXINE SUCCINATE 50 MG/1
50 TABLET, EXTENDED RELEASE ORAL DAILY
Qty: 90 TABLET | Refills: 1 | Status: SHIPPED | OUTPATIENT
Start: 2024-01-26 | End: 2024-01-29 | Stop reason: SDUPTHER

## 2024-01-29 DIAGNOSIS — F41.1 GENERALIZED ANXIETY DISORDER: ICD-10-CM

## 2024-01-29 RX ORDER — DESVENLAFAXINE SUCCINATE 50 MG/1
50 TABLET, EXTENDED RELEASE ORAL DAILY
Qty: 90 TABLET | Refills: 1 | Status: SHIPPED | OUTPATIENT
Start: 2024-01-29

## 2024-01-29 RX ORDER — DESVENLAFAXINE 25 MG/1
25 TABLET, EXTENDED RELEASE ORAL DAILY
Qty: 90 TABLET | Refills: 1 | Status: SHIPPED | OUTPATIENT
Start: 2024-01-29

## 2024-01-31 DIAGNOSIS — R79.89 ELEVATED SERUM CREATININE: Primary | ICD-10-CM

## 2024-02-23 ENCOUNTER — OFFICE VISIT (OUTPATIENT)
Dept: INTERNAL MEDICINE | Facility: CLINIC | Age: 24
End: 2024-02-23
Payer: COMMERCIAL

## 2024-02-23 VITALS
WEIGHT: 167.4 LBS | BODY MASS INDEX: 27.89 KG/M2 | SYSTOLIC BLOOD PRESSURE: 114 MMHG | TEMPERATURE: 98.4 F | DIASTOLIC BLOOD PRESSURE: 72 MMHG | HEIGHT: 65 IN

## 2024-02-23 DIAGNOSIS — R79.89 ELEVATED SERUM CREATININE: ICD-10-CM

## 2024-02-23 DIAGNOSIS — R14.0 ABDOMINAL BLOATING: Primary | ICD-10-CM

## 2024-02-23 PROCEDURE — 99213 OFFICE O/P EST LOW 20 MIN: CPT | Performed by: PHYSICIAN ASSISTANT

## 2024-02-23 RX ORDER — CALCIUM POLYCARBOPHIL 625 MG
TABLET ORAL DAILY
COMMUNITY

## 2024-02-23 NOTE — PROGRESS NOTES
Subjective   Chief Complaint   Patient presents with    Abdominal Pain     F/U       History of Present Illness      Pt is here today for fup on her abdominal discomfort and bloating. She has been on Ibgard and fiber for 1 mo and has had 75% improvement in her symptoms. Bowel urgency has diminished considerably. She has not tried a fodmap diet before.       Patient Active Problem List   Diagnosis    Migraine with aura and without status migrainosus, not intractable    Anxiety disorder, unspecified    Dysmenorrhea, unspecified    Acne    Scoliosis    Perioral dermatitis    Abdominal bloating       No Known Allergies    Current Outpatient Medications on File Prior to Visit   Medication Sig Dispense Refill    amitriptyline (ELAVIL) 50 MG tablet Take 1 tablet by mouth every night at bedtime. 90 tablet 1    busPIRone (BUSPAR) 10 MG tablet Take 2 tablets by mouth 3 (Three) Times a Day. 540 tablet 1    clobetasol (TEMOVATE) 0.05 % external solution Apply  topically to the appropriate area as directed 2 (Two) Times a Day. 50 mL 3    desvenlafaxine (PRISTIQ) 50 MG 24 hr tablet Take 1 tablet by mouth Daily. With 25 mg dose for a total of 75 mg daily 90 tablet 1    Desvenlafaxine Succinate ER 25 MG tablet sustained-release 24 hour Take 1 tablet by mouth Daily. With 50 mg tab for a total of 75 mg daily 90 tablet 1    hydrOXYzine (ATARAX) 25 MG tablet Take 1 tablet by mouth 3 (Three) Times a Day As Needed for Anxiety. 270 tablet 1    ketoconazole (NIZORAL) 2 % shampoo APPLY TOPICALLY TO THE APPROPRIATE AREA AS DIRECTED 2 (TWO) TIMES A WEEK. 120 mL 3    propranolol (INDERAL) 80 MG tablet Take 1 tablet by mouth 3 (Three) Times a Day As Needed (anxiety). 90 tablet 1    rizatriptan MLT (MAXALT-MLT) 10 MG disintegrating tablet Place 1 tablet on the tongue 1 (One) Time As Needed for Migraine. May repeat in 2 hours if needed 9 tablet 2    spironolactone (ALDACTONE) 50 MG tablet Take 1 tablet by mouth 2 (Two) Times a Day.       Peppermint Oil (IBGARD PO) Take  by mouth.      polycarbophil (calcium polycarbophil) 625 MG tablet tablet Take  by mouth Daily.       No current facility-administered medications on file prior to visit.       Past Medical History:   Diagnosis Date    Anxiety disorder, unspecified     Atopic dermatitis, unspecified     Depression 2010    Dysmenorrhea, unspecified     Epigastric pain     History of MRI 01/01/2014    BRAIN NORMAL (DONE FOR COMPLICATED MIGRAINES)    Menstrual migraine, not intractable, without status migrainosus     Migraine with aura, not intractable, without status migrainosus     Pain in right finger(s)     Perioral dermatitis 01/14/2021       Family History   Problem Relation Age of Onset    Stroke Mother         TIA    Depression Father     Alcohol abuse Father     Cancer Father         Retinal    Cancer Paternal Grandfather         melanoma    Stroke Maternal Grandmother     Arthritis Maternal Grandmother     Hyperlipidemia Maternal Grandmother     Thyroid disease Maternal Grandmother        Social History     Socioeconomic History    Marital status: Single   Tobacco Use    Smoking status: Never    Smokeless tobacco: Never   Vaping Use    Vaping Use: Never used   Substance and Sexual Activity    Alcohol use: Yes     Alcohol/week: 2.0 standard drinks of alcohol     Types: 2 Shots of liquor per week    Drug use: Never    Sexual activity: Yes     Partners: Male     Birth control/protection: Condom       Past Surgical History:   Procedure Laterality Date    ADENOIDECTOMY  2000    TONSILLECTOMY  2000         The following portions of the patient's history were reviewed and updated as appropriate: problem list, allergies, current medications, past medical history, past family history, past social history, and past surgical history.    ROS    See HPI    Immunization History   Administered Date(s) Administered    COVID-19 (MODERNA) 1st,2nd,3rd Dose Monovalent 01/28/2021, 02/25/2021, 11/27/2021    DTaP  "02/08/1999, 04/08/1999, 06/03/1999, 03/01/2000, 05/09/2003    Flu Vaccine Split Quad 11/06/2017    Fluzone (or Fluarix & Flulaval for VFC) >6mos 11/06/2017    Hepatitis A 05/11/2018, 07/12/2019    Hepatitis B Adult/Adolescent IM 02/08/1999, 06/03/1999, 12/06/1999    HiB 04/08/1999, 03/01/2000    IPV 02/08/1999, 09/03/1999, 12/06/1999, 03/01/2000    Influenza, Unspecified 11/26/2019, 11/25/2021, 09/05/2022    MMR 12/06/1999, 05/09/2003    Meningococcal Conjugate 07/22/2010    PEDS-Pneumococcal Conjugate (PCV7) 07/10/2000, 12/05/2000    Tdap 07/22/2010, 07/22/2010, 07/15/2021    Varicella 07/10/2000, 07/22/2010       Objective   Vitals:    02/23/24 0841   BP: 114/72   Temp: 98.4 °F (36.9 °C)   Weight: 75.9 kg (167 lb 6.4 oz)   Height: 165.1 cm (65\")     Body mass index is 27.86 kg/m².  Physical Exam  Vitals reviewed.   Constitutional:       Appearance: Normal appearance.   HENT:      Head: Normocephalic and atraumatic.   Cardiovascular:      Rate and Rhythm: Normal rate and regular rhythm.   Neurological:      Mental Status: She is alert.           Assessment & Plan   Diagnoses and all orders for this visit:    1. Abdominal bloating (Primary)  Comments:  Sx have improved with IBgard and fibercon daily, will continue. She will also try low fodmap diet. FUP in 6 mo. Call if sx worsen or change.    2. Elevated serum creatinine  Comments:  resolved, nml on repeat         Return in about 6 months (around 8/23/2024).           "

## 2024-03-04 DIAGNOSIS — G43.109 MIGRAINE WITH AURA AND WITHOUT STATUS MIGRAINOSUS, NOT INTRACTABLE: ICD-10-CM

## 2024-03-04 RX ORDER — AMITRIPTYLINE HYDROCHLORIDE 50 MG/1
50 TABLET, FILM COATED ORAL
Qty: 90 TABLET | Refills: 1 | Status: SHIPPED | OUTPATIENT
Start: 2024-03-04

## 2024-03-26 DIAGNOSIS — F41.1 GENERALIZED ANXIETY DISORDER: ICD-10-CM

## 2024-03-26 RX ORDER — BUSPIRONE HYDROCHLORIDE 10 MG/1
20 TABLET ORAL 3 TIMES DAILY
Qty: 540 TABLET | Refills: 1 | Status: SHIPPED | OUTPATIENT
Start: 2024-03-26

## 2024-05-27 DIAGNOSIS — F41.1 GENERALIZED ANXIETY DISORDER: ICD-10-CM

## 2024-05-28 RX ORDER — DESVENLAFAXINE 25 MG/1
25 TABLET, EXTENDED RELEASE ORAL DAILY
Qty: 90 TABLET | Refills: 1 | Status: SHIPPED | OUTPATIENT
Start: 2024-05-28

## 2024-05-28 RX ORDER — DESVENLAFAXINE SUCCINATE 50 MG/1
50 TABLET, EXTENDED RELEASE ORAL DAILY
Qty: 90 TABLET | Refills: 1 | Status: SHIPPED | OUTPATIENT
Start: 2024-05-28

## 2024-08-23 ENCOUNTER — OFFICE VISIT (OUTPATIENT)
Dept: INTERNAL MEDICINE | Facility: CLINIC | Age: 24
End: 2024-08-23
Payer: COMMERCIAL

## 2024-08-23 VITALS
SYSTOLIC BLOOD PRESSURE: 110 MMHG | DIASTOLIC BLOOD PRESSURE: 60 MMHG | HEIGHT: 65 IN | WEIGHT: 159 LBS | BODY MASS INDEX: 26.49 KG/M2 | TEMPERATURE: 97.9 F

## 2024-08-23 DIAGNOSIS — R14.0 ABDOMINAL BLOATING: Primary | ICD-10-CM

## 2024-08-23 DIAGNOSIS — G43.109 MIGRAINE WITH AURA AND WITHOUT STATUS MIGRAINOSUS, NOT INTRACTABLE: ICD-10-CM

## 2024-08-23 DIAGNOSIS — Z00.00 ANNUAL PHYSICAL EXAM: ICD-10-CM

## 2024-08-23 RX ORDER — AMITRIPTYLINE HYDROCHLORIDE 50 MG/1
50 TABLET, FILM COATED ORAL
Qty: 90 TABLET | Refills: 1 | Status: SHIPPED | OUTPATIENT
Start: 2024-08-23

## 2024-08-23 NOTE — PROGRESS NOTES
Subjective   Chief Complaint   Patient presents with    Migraine       History of Present Illness      Still having some gas and bloating.  Still taking the IBGARD, still better than her symptoms were and the fiber supplement is still helping. Has not yet tried the fodmap diet.  Sx are occurring a few times a week. No blood in her stools. Anxiety is still a little high. Taking the Propranolol and Hydroxyzine a few times throughout the day. Has had no migraine headaches recently.     Patient Active Problem List   Diagnosis    Migraine with aura and without status migrainosus, not intractable    Anxiety disorder, unspecified    Dysmenorrhea, unspecified    Acne    Scoliosis    Perioral dermatitis    Abdominal bloating       No Known Allergies    Current Outpatient Medications on File Prior to Visit   Medication Sig Dispense Refill    busPIRone (BUSPAR) 10 MG tablet Take 2 tablets by mouth 3 (Three) Times a Day. 540 tablet 1    clobetasol (TEMOVATE) 0.05 % external solution Apply  topically to the appropriate area as directed 2 (Two) Times a Day. 50 mL 3    desvenlafaxine (PRISTIQ) 50 MG 24 hr tablet Take 1 tablet by mouth Daily. With 25 mg dose for a total of 75 mg daily 90 tablet 1    Desvenlafaxine Succinate ER 25 MG tablet sustained-release 24 hour Take 1 tablet by mouth Daily. With 50 mg tab for a total of 75 mg daily 90 tablet 1    hydrOXYzine (ATARAX) 25 MG tablet Take 1 tablet by mouth 3 (Three) Times a Day As Needed for Anxiety. 270 tablet 1    ketoconazole (NIZORAL) 2 % shampoo APPLY TOPICALLY TO THE APPROPRIATE AREA AS DIRECTED 2 (TWO) TIMES A WEEK. 120 mL 3    Peppermint Oil (IBGARD PO) Take  by mouth.      polycarbophil (calcium polycarbophil) 625 MG tablet tablet Take  by mouth Daily.      propranolol (INDERAL) 80 MG tablet Take 1 tablet by mouth 3 (Three) Times a Day As Needed (anxiety). 90 tablet 1    rizatriptan MLT (MAXALT-MLT) 10 MG disintegrating tablet Place 1 tablet on the tongue 1 (One) Time As  Needed for Migraine. May repeat in 2 hours if needed 9 tablet 2    spironolactone (ALDACTONE) 50 MG tablet Take 1 tablet by mouth 2 (Two) Times a Day.      [DISCONTINUED] amitriptyline (ELAVIL) 50 MG tablet Take 1 tablet by mouth every night at bedtime. 90 tablet 1     No current facility-administered medications on file prior to visit.       Past Medical History:   Diagnosis Date    Anxiety disorder, unspecified     Atopic dermatitis, unspecified     Depression 2010    Dysmenorrhea, unspecified     Epigastric pain     History of MRI 01/01/2014    BRAIN NORMAL (DONE FOR COMPLICATED MIGRAINES)    Menstrual migraine, not intractable, without status migrainosus     Migraine with aura, not intractable, without status migrainosus     Pain in right finger(s)     Perioral dermatitis 01/14/2021       Family History   Problem Relation Age of Onset    Stroke Mother         TIA    Depression Father     Alcohol abuse Father     Cancer Father         Retinal    Cancer Paternal Grandfather         melanoma    Stroke Maternal Grandmother     Arthritis Maternal Grandmother     Hyperlipidemia Maternal Grandmother     Thyroid disease Maternal Grandmother        Social History     Socioeconomic History    Marital status: Single   Tobacco Use    Smoking status: Never    Smokeless tobacco: Never   Vaping Use    Vaping status: Never Used   Substance and Sexual Activity    Alcohol use: Not Currently     Alcohol/week: 2.0 standard drinks of alcohol    Drug use: Never    Sexual activity: Yes     Partners: Male     Birth control/protection: Condom       Past Surgical History:   Procedure Laterality Date    ADENOIDECTOMY  2000    TONSILLECTOMY  2000     The following portions of the patient's history were reviewed and updated as appropriate: problem list, allergies, current medications, past medical history, past family history, past social history, and past surgical history.    ROS    See HPI    Immunization History   Administered Date(s)  "Administered    COVID-19 (MODERNA) 1st,2nd,3rd Dose Monovalent 01/28/2021, 02/25/2021, 11/27/2021    DTaP 02/08/1999, 04/08/1999, 06/03/1999, 03/01/2000, 05/09/2003    Flu Vaccine Split Quad 11/06/2017    Fluzone (or Fluarix & Flulaval for VFC) >6mos 11/06/2017    Hepatitis A 05/11/2018, 07/12/2019    Hepatitis B Adult/Adolescent IM 02/08/1999, 06/03/1999, 12/06/1999    HiB 04/08/1999, 03/01/2000    IPV 02/08/1999, 09/03/1999, 12/06/1999, 03/01/2000    Influenza, Unspecified 11/26/2019, 11/25/2021, 09/05/2022    MMR 12/06/1999, 05/09/2003    Meningococcal Conjugate 07/22/2010    PEDS-Pneumococcal Conjugate (PCV7) 07/10/2000, 12/05/2000    Tdap 07/22/2010, 07/22/2010, 07/15/2021    Varicella 07/10/2000, 07/22/2010       Objective   Vitals:    08/23/24 0840   BP: 110/60   Temp: 97.9 °F (36.6 °C)   Weight: 72.1 kg (159 lb)   Height: 165.1 cm (65\")     Body mass index is 26.46 kg/m².  Physical Exam  Vitals reviewed.   Constitutional:       Appearance: Normal appearance.   HENT:      Head: Normocephalic and atraumatic.   Cardiovascular:      Rate and Rhythm: Normal rate and regular rhythm.      Heart sounds: Normal heart sounds.   Pulmonary:      Effort: Pulmonary effort is normal.      Breath sounds: Normal breath sounds.   Neurological:      Mental Status: She is alert.       Assessment & Plan   Diagnoses and all orders for this visit:    1. Abdominal bloating (Primary)    2. Migraine with aura and without status migrainosus, not intractable    3. Annual physical exam  -     CBC & Differential; Future  -     Comprehensive Metabolic Panel; Future  -     Lipid Panel With / Chol / HDL Ratio; Future  -     Vitamin D,25-Hydroxy; Future     Encouraged to try the fodmap diet for 6-8 weeks and continue the IBGARD and fiber supplement. If no real improvement with the fodmap diet would refer to GI for possible SIBO. Migraines are stable. FUP in 7 mo for CPE with lab prior.     Return in about 7 months (around 4/2/2025) for Lab " Appt Before FUP, Annual physical.

## 2024-10-20 DIAGNOSIS — F41.1 GENERALIZED ANXIETY DISORDER: ICD-10-CM

## 2024-10-21 RX ORDER — BUSPIRONE HYDROCHLORIDE 10 MG/1
20 TABLET ORAL 3 TIMES DAILY
Qty: 540 TABLET | Refills: 1 | Status: SHIPPED | OUTPATIENT
Start: 2024-10-21

## 2025-02-11 DIAGNOSIS — G43.109 MIGRAINE WITH AURA AND WITHOUT STATUS MIGRAINOSUS, NOT INTRACTABLE: ICD-10-CM

## 2025-02-11 DIAGNOSIS — F41.1 GENERALIZED ANXIETY DISORDER: ICD-10-CM

## 2025-02-11 RX ORDER — DESVENLAFAXINE 50 MG/1
50 TABLET, FILM COATED, EXTENDED RELEASE ORAL DAILY
Qty: 90 TABLET | Refills: 1 | Status: SHIPPED | OUTPATIENT
Start: 2025-02-11

## 2025-02-11 RX ORDER — DESVENLAFAXINE 25 MG/1
25 TABLET, EXTENDED RELEASE ORAL DAILY
Qty: 90 TABLET | Refills: 1 | Status: SHIPPED | OUTPATIENT
Start: 2025-02-11

## 2025-02-11 RX ORDER — AMITRIPTYLINE HYDROCHLORIDE 50 MG/1
50 TABLET ORAL
Qty: 90 TABLET | Refills: 1 | Status: SHIPPED | OUTPATIENT
Start: 2025-02-11

## 2025-03-26 DIAGNOSIS — F41.1 GENERALIZED ANXIETY DISORDER: ICD-10-CM

## 2025-03-26 RX ORDER — BUSPIRONE HYDROCHLORIDE 10 MG/1
20 TABLET ORAL 3 TIMES DAILY
Qty: 540 TABLET | Refills: 1 | Status: SHIPPED | OUTPATIENT
Start: 2025-03-26

## 2025-04-04 ENCOUNTER — OFFICE VISIT (OUTPATIENT)
Dept: INTERNAL MEDICINE | Facility: CLINIC | Age: 25
End: 2025-04-04
Payer: COMMERCIAL

## 2025-04-04 VITALS
SYSTOLIC BLOOD PRESSURE: 106 MMHG | HEIGHT: 65 IN | TEMPERATURE: 97.5 F | WEIGHT: 165 LBS | DIASTOLIC BLOOD PRESSURE: 74 MMHG | BODY MASS INDEX: 27.49 KG/M2

## 2025-04-04 DIAGNOSIS — F41.1 GENERALIZED ANXIETY DISORDER: ICD-10-CM

## 2025-04-04 DIAGNOSIS — Z00.00 ANNUAL PHYSICAL EXAM: Primary | ICD-10-CM

## 2025-04-04 PROCEDURE — 99395 PREV VISIT EST AGE 18-39: CPT | Performed by: PHYSICIAN ASSISTANT

## 2025-04-04 RX ORDER — DESVENLAFAXINE 100 MG/1
100 TABLET, EXTENDED RELEASE ORAL DAILY
Qty: 90 TABLET | Refills: 3 | Status: SHIPPED | OUTPATIENT
Start: 2025-04-04

## 2025-04-04 NOTE — PROGRESS NOTES
Subjective   Chief Complaint   Patient presents with    Anxiety    Annual Exam       History of Present Illness     Pt is here today for CPE. Has been doing therapy every 2 weeks. 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 Migraines have been stable. Bloating, diarrhea has been worse with the worsened anxiety.      Patient Active Problem List   Diagnosis    Migraine with aura and without status migrainosus, not intractable    Anxiety disorder, unspecified    Dysmenorrhea, unspecified    Acne    Scoliosis    Perioral dermatitis    Abdominal bloating       No Known Allergies    Current Outpatient Medications on File Prior to Visit   Medication Sig Dispense Refill    amitriptyline (ELAVIL) 50 MG tablet Take 1 tablet by mouth every night at bedtime. 90 tablet 1    busPIRone (BUSPAR) 10 MG tablet TAKE 2 TABLETS BY MOUTH 3 TIMES A DAY. 540 tablet 1    clobetasol (TEMOVATE) 0.05 % external solution Apply  topically to the appropriate area as directed 2 (Two) Times a Day. 50 mL 3    hydrOXYzine (ATARAX) 25 MG tablet Take 1 tablet by mouth 3 (Three) Times a Day As Needed for Anxiety. 270 tablet 1    ketoconazole (NIZORAL) 2 % shampoo APPLY TOPICALLY TO THE APPROPRIATE AREA AS DIRECTED 2 (TWO) TIMES A WEEK. 120 mL 3    Peppermint Oil (IBGARD PO) Take  by mouth.      polycarbophil (calcium polycarbophil) 625 MG tablet tablet Take  by mouth Daily.      propranolol (INDERAL) 80 MG tablet Take 1 tablet by mouth 3 (Three) Times a Day As Needed (anxiety). 90 tablet 1    rizatriptan MLT (MAXALT-MLT) 10 MG disintegrating tablet Place 1 tablet on the tongue 1 (One) Time As Needed for Migraine. May repeat in 2 hours if needed 9 tablet 2    spironolactone (ALDACTONE) 50 MG tablet Take 1 tablet by mouth 2 (Two) Times a Day.      [DISCONTINUED] desvenlafaxine (PRISTIQ) 50 MG 24 hr tablet Take 1 tablet by mouth Daily. With 25 mg dose for a total of 75 mg daily 90 tablet 1    [DISCONTINUED]  Desvenlafaxine Succinate ER 25 MG tablet sustained-release 24 hour Take 1 tablet by mouth Daily. With 50 mg tab for a total of 75 mg daily 90 tablet 1     No current facility-administered medications on file prior to visit.       Past Medical History:   Diagnosis Date    Anxiety disorder, unspecified     Atopic dermatitis, unspecified     Depression 2010    Dysmenorrhea, unspecified     Epigastric pain     History of MRI 01/01/2014    BRAIN NORMAL (DONE FOR COMPLICATED MIGRAINES)    Menstrual migraine, not intractable, without status migrainosus     Migraine with aura, not intractable, without status migrainosus     Pain in right finger(s)     Perioral dermatitis 01/14/2021       Family History   Problem Relation Age of Onset    Stroke Mother         TIA    Depression Father     Alcohol abuse Father     Cancer Father         Retinal    Cancer Paternal Grandfather         melanoma    Stroke Maternal Grandmother     Arthritis Maternal Grandmother     Hyperlipidemia Maternal Grandmother     Thyroid disease Maternal Grandmother        Social History     Socioeconomic History    Marital status: Single   Tobacco Use    Smoking status: Never    Smokeless tobacco: Never   Vaping Use    Vaping status: Never Used   Substance and Sexual Activity    Alcohol use: Not Currently     Alcohol/week: 2.0 standard drinks of alcohol    Drug use: Never    Sexual activity: Yes     Partners: Male     Birth control/protection: Condom       Past Surgical History:   Procedure Laterality Date    ADENOIDECTOMY  2000    TONSILLECTOMY  2000       The following portions of the patient's history were reviewed and updated as appropriate: problem list, allergies, current medications, past medical history, past family history, past social history, and past surgical history.    ROS    See HPI    Immunization History   Administered Date(s) Administered    COVID-19 (MODERNA) 1st,2nd,3rd Dose Monovalent 01/28/2021, 02/25/2021, 11/27/2021    DTaP  "02/08/1999, 04/08/1999, 06/03/1999, 03/01/2000, 05/09/2003    Flu Vaccine Split Quad 11/06/2017    Fluzone (or Fluarix & Flulaval for VFC) >6mos 11/06/2017    Hepatitis A 05/11/2018, 07/12/2019    Hepatitis B Adult/Adolescent IM 02/08/1999, 06/03/1999, 12/06/1999    HiB 04/08/1999, 03/01/2000    IPV 02/08/1999, 09/03/1999, 12/06/1999, 03/01/2000    Influenza, Unspecified 11/26/2019, 11/25/2021, 09/05/2022    MMR 12/06/1999, 05/09/2003    Meningococcal Conjugate 07/22/2010    PEDS-Pneumococcal Conjugate (PCV7) 07/10/2000, 12/05/2000    Tdap 07/22/2010, 07/22/2010, 07/15/2021    Varicella 07/10/2000, 07/22/2010       Objective   Vitals:    04/04/25 1252   BP: 106/74   Temp: 97.5 °F (36.4 °C)   Weight: 74.8 kg (165 lb)   Height: 165.1 cm (65\")     Body mass index is 27.46 kg/m².  Physical Exam  Vitals and nursing note reviewed.   Constitutional:       Appearance: Normal appearance.   HENT:      Head: Normocephalic and atraumatic.      Right Ear: Tympanic membrane and ear canal normal.      Left Ear: Tympanic membrane and ear canal normal.      Nose: Nose normal.      Mouth/Throat:      Mouth: Mucous membranes are moist.      Pharynx: Oropharynx is clear.   Eyes:      Extraocular Movements: Extraocular movements intact.      Conjunctiva/sclera: Conjunctivae normal.      Pupils: Pupils are equal, round, and reactive to light.   Neck:      Thyroid: No thyromegaly.   Cardiovascular:      Rate and Rhythm: Normal rate and regular rhythm.      Heart sounds: Normal heart sounds. No murmur heard.  Pulmonary:      Effort: Pulmonary effort is normal.      Breath sounds: Normal breath sounds.   Abdominal:      General: Abdomen is flat. Bowel sounds are normal.      Palpations: Abdomen is soft. There is no hepatomegaly or splenomegaly.      Tenderness: There is no abdominal tenderness. There is no guarding.   Musculoskeletal:      Cervical back: Neck supple.   Lymphadenopathy:      Cervical: No cervical adenopathy.   Skin:     " General: Skin is warm and dry.   Neurological:      General: No focal deficit present.      Mental Status: She is alert and oriented to person, place, and time. Mental status is at baseline.      Gait: Gait normal.   Psychiatric:         Mood and Affect: Mood normal.         Behavior: Behavior normal.         Thought Content: Thought content normal.         Judgment: Judgment normal.           Assessment & Plan   Diagnoses and all orders for this visit:    1. Annual physical exam (Primary)    2. Generalized anxiety disorder  -     desvenlafaxine (Pristiq) 100 MG 24 hr tablet; Take 1 tablet by mouth Daily.  Dispense: 90 tablet; Refill: 3     Labs today. Will increase her Pristiq to 100 mg daily. FUP in 2-3 weeks. Recommended healthy diet, and 150 min of aerobic exercise per week      Return in about 2 weeks (around 4/18/2025) for Lab Today.

## 2025-04-21 ENCOUNTER — TELEMEDICINE (OUTPATIENT)
Dept: INTERNAL MEDICINE | Facility: CLINIC | Age: 25
End: 2025-04-21
Payer: COMMERCIAL

## 2025-04-21 DIAGNOSIS — F41.1 GENERALIZED ANXIETY DISORDER: Primary | ICD-10-CM

## 2025-04-21 PROCEDURE — 99213 OFFICE O/P EST LOW 20 MIN: CPT | Performed by: PHYSICIAN ASSISTANT

## 2025-04-21 RX ORDER — DESVENLAFAXINE 50 MG/1
50 TABLET, FILM COATED, EXTENDED RELEASE ORAL DAILY
Qty: 90 TABLET | Refills: 0 | Status: SHIPPED | OUTPATIENT
Start: 2025-04-21

## 2025-04-21 RX ORDER — BUPROPION HYDROCHLORIDE 100 MG/1
100 TABLET, EXTENDED RELEASE ORAL 2 TIMES DAILY
Qty: 180 TABLET | Refills: 0 | Status: SHIPPED | OUTPATIENT
Start: 2025-04-21

## 2025-04-21 NOTE — PROGRESS NOTES
Subjective   Chief Complaint   Patient presents with    Anxiety   Mode of Visit: Video  Location of patient: home  Location of provider: [facility] [place] [facility]  You have chosen to receive care through a telehealth visit.  Does the patient consent to use a video/audio connection for your medical care today? Yes  The visit included audio and video interaction. No technical issues occurred during this visit.       History of Present Illness     She states her anxiety is only a little better on the Pristiq 100 mg.  Is having excessive sweating and night sweats now with the higher dose that was not occurring before. She has never tried Wellbutrin.      Patient Active Problem List   Diagnosis    Migraine with aura and without status migrainosus, not intractable    Anxiety disorder, unspecified    Dysmenorrhea, unspecified    Acne    Scoliosis    Perioral dermatitis    Abdominal bloating       No Known Allergies    Current Outpatient Medications on File Prior to Visit   Medication Sig Dispense Refill    busPIRone (BUSPAR) 10 MG tablet TAKE 2 TABLETS BY MOUTH 3 TIMES A DAY. 540 tablet 1    hydrOXYzine (ATARAX) 25 MG tablet Take 1 tablet by mouth 3 (Three) Times a Day As Needed for Anxiety. 270 tablet 1    propranolol (INDERAL) 80 MG tablet Take 1 tablet by mouth 3 (Three) Times a Day As Needed (anxiety). 90 tablet 1    amitriptyline (ELAVIL) 50 MG tablet Take 1 tablet by mouth every night at bedtime. 90 tablet 1    clobetasol (TEMOVATE) 0.05 % external solution Apply  topically to the appropriate area as directed 2 (Two) Times a Day. 50 mL 3    ketoconazole (NIZORAL) 2 % shampoo APPLY TOPICALLY TO THE APPROPRIATE AREA AS DIRECTED 2 (TWO) TIMES A WEEK. 120 mL 3    Peppermint Oil (IBGARD PO) Take  by mouth.      polycarbophil (calcium polycarbophil) 625 MG tablet tablet Take  by mouth Daily.      rizatriptan MLT (MAXALT-MLT) 10 MG disintegrating tablet Place 1 tablet on the tongue 1 (One) Time As Needed for Migraine. May repeat in 2  hours if needed 9 tablet 2    spironolactone (ALDACTONE) 50 MG tablet Take 1 tablet by mouth 2 (Two) Times a Day.       No current facility-administered medications on file prior to visit.       Past Medical History:   Diagnosis Date    Anxiety disorder, unspecified     Atopic dermatitis, unspecified     Depression 2010    Dysmenorrhea, unspecified     Epigastric pain     History of MRI 01/01/2014    BRAIN NORMAL (DONE FOR COMPLICATED MIGRAINES)    Menstrual migraine, not intractable, without status migrainosus     Migraine with aura, not intractable, without status migrainosus     Pain in right finger(s)     Perioral dermatitis 01/14/2021       Family History   Problem Relation Age of Onset    Stroke Mother         TIA    Depression Father     Alcohol abuse Father     Cancer Father         Retinal    Cancer Paternal Grandfather         melanoma    Stroke Maternal Grandmother     Arthritis Maternal Grandmother     Hyperlipidemia Maternal Grandmother     Thyroid disease Maternal Grandmother        Social History     Socioeconomic History    Marital status: Single   Tobacco Use    Smoking status: Never    Smokeless tobacco: Never   Vaping Use    Vaping status: Never Used   Substance and Sexual Activity    Alcohol use: Not Currently     Alcohol/week: 2.0 standard drinks of alcohol    Drug use: Never    Sexual activity: Yes     Partners: Male     Birth control/protection: Condom       Past Surgical History:   Procedure Laterality Date    ADENOIDECTOMY  2000    TONSILLECTOMY  2000         The following portions of the patient's history were reviewed and updated as appropriate: problem list, allergies, current medications, past medical history, past family history, past social history, and past surgical history.    ROS    See HPI    Immunization History   Administered Date(s) Administered    COVID-19 (MODERNA) 1st,2nd,3rd Dose Monovalent 01/28/2021, 02/25/2021, 11/27/2021    DTaP 02/08/1999, 04/08/1999, 06/03/1999,  03/01/2000, 05/09/2003    Flu Vaccine Split Quad 11/06/2017    Fluzone (or Fluarix & Flulaval for VFC) >6mos 11/06/2017    Hepatitis A 05/11/2018, 07/12/2019    Hepatitis B Adult/Adolescent IM 02/08/1999, 06/03/1999, 12/06/1999    HiB 04/08/1999, 03/01/2000    IPV 02/08/1999, 09/03/1999, 12/06/1999, 03/01/2000    Influenza, Unspecified 11/26/2019, 11/25/2021, 09/05/2022    MMR 12/06/1999, 05/09/2003    Meningococcal Conjugate 07/22/2010    PEDS-Pneumococcal Conjugate (PCV7) 07/10/2000, 12/05/2000    Tdap 07/22/2010, 07/22/2010, 07/15/2021    Varicella 07/10/2000, 07/22/2010       Objective   There were no vitals filed for this visit.  There is no height or weight on file to calculate BMI.  Physical Exam  Constitutional:       Appearance: Normal appearance.   HENT:      Head: Normocephalic and atraumatic.   Neurological:      Mental Status: She is alert.   Psychiatric:         Mood and Affect: Mood normal.         Behavior: Behavior normal.         Thought Content: Thought content normal.         Judgment: Judgment normal.           Assessment & Plan   Diagnoses and all orders for this visit:    1. Generalized anxiety disorder (Primary)  -     buPROPion SR (Wellbutrin SR) 100 MG 12 hr tablet; Take 1 tablet by mouth 2 (Two) Times a Day.  Dispense: 180 tablet; Refill: 0  -     desvenlafaxine (Pristiq) 50 MG 24 hr tablet; Take 1 tablet by mouth Daily.  Dispense: 90 tablet; Refill: 0     Decrease her Pristiq back to 50 mg, more side effects than benefits with the 100 mg dose. Will add a very low dose of Wellbutrin 100 mg BID and see if this will help. Discussed se with her. FUP in 2 weeks.

## 2025-04-22 ENCOUNTER — TELEPHONE (OUTPATIENT)
Dept: INTERNAL MEDICINE | Facility: CLINIC | Age: 25
End: 2025-04-22
Payer: COMMERCIAL

## 2025-05-09 ENCOUNTER — TELEMEDICINE (OUTPATIENT)
Dept: INTERNAL MEDICINE | Facility: CLINIC | Age: 25
End: 2025-05-09
Payer: COMMERCIAL

## 2025-05-09 DIAGNOSIS — F41.1 GENERALIZED ANXIETY DISORDER: Primary | ICD-10-CM

## 2025-05-09 PROCEDURE — 99213 OFFICE O/P EST LOW 20 MIN: CPT | Performed by: PHYSICIAN ASSISTANT

## 2025-05-09 RX ORDER — DESVENLAFAXINE 25 MG/1
25 TABLET, EXTENDED RELEASE ORAL DAILY
Qty: 90 TABLET | Refills: 0 | Status: SHIPPED | OUTPATIENT
Start: 2025-05-09

## 2025-08-06 DIAGNOSIS — G43.109 MIGRAINE WITH AURA AND WITHOUT STATUS MIGRAINOSUS, NOT INTRACTABLE: ICD-10-CM

## 2025-08-06 RX ORDER — AMITRIPTYLINE HYDROCHLORIDE 50 MG/1
50 TABLET ORAL
Qty: 90 TABLET | Refills: 1 | Status: SHIPPED | OUTPATIENT
Start: 2025-08-06

## 2025-08-07 DIAGNOSIS — F41.1 GENERALIZED ANXIETY DISORDER: ICD-10-CM

## 2025-08-08 RX ORDER — DESVENLAFAXINE 25 MG/1
25 TABLET, EXTENDED RELEASE ORAL DAILY
Qty: 90 TABLET | Refills: 0 | Status: SHIPPED | OUTPATIENT
Start: 2025-08-08